# Patient Record
Sex: MALE | Race: WHITE | NOT HISPANIC OR LATINO | Employment: STUDENT | URBAN - METROPOLITAN AREA
[De-identification: names, ages, dates, MRNs, and addresses within clinical notes are randomized per-mention and may not be internally consistent; named-entity substitution may affect disease eponyms.]

---
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
## 2019-01-07 RX ORDER — LEVOTHYROXINE SODIUM 0.07 MG/1
TABLET ORAL
Qty: 30 TABLET | Refills: 0 | Status: SHIPPED | OUTPATIENT
Start: 2019-01-07 | End: 2019-01-09 | Stop reason: SDUPTHER

## 2019-01-08 ENCOUNTER — TELEPHONE (OUTPATIENT)
Dept: INTERNAL MEDICINE | Facility: CLINIC | Age: 19
End: 2019-01-08

## 2019-01-09 ENCOUNTER — TELEPHONE (OUTPATIENT)
Dept: INTERNAL MEDICINE | Facility: CLINIC | Age: 19
End: 2019-01-09

## 2019-01-09 DIAGNOSIS — E66.9 OBESITY (BMI 30-39.9): ICD-10-CM

## 2019-01-09 RX ORDER — LEVOTHYROXINE SODIUM 0.07 MG/1
TABLET ORAL
Qty: 30 TABLET | Refills: 0 | Status: SHIPPED | OUTPATIENT
Start: 2019-01-09 | End: 2019-02-01 | Stop reason: SDUPTHER

## 2019-01-09 NOTE — TELEPHONE ENCOUNTER
Pt mother advised she should have rec'd a letter regarding his labs but given results over the phone via [place].

## 2019-02-01 ENCOUNTER — OFFICE VISIT (OUTPATIENT)
Dept: INTERNAL MEDICINE | Facility: CLINIC | Age: 19
End: 2019-02-01

## 2019-02-01 VITALS
SYSTOLIC BLOOD PRESSURE: 128 MMHG | HEIGHT: 71 IN | BODY MASS INDEX: 34.16 KG/M2 | TEMPERATURE: 97.4 F | WEIGHT: 244 LBS | DIASTOLIC BLOOD PRESSURE: 88 MMHG

## 2019-02-01 DIAGNOSIS — E78.1 HYPERTRIGLYCERIDEMIA: ICD-10-CM

## 2019-02-01 DIAGNOSIS — K76.9 HEPATIC DYSFUNCTION: ICD-10-CM

## 2019-02-01 DIAGNOSIS — E03.9 HYPOTHYROIDISM (ACQUIRED): Primary | ICD-10-CM

## 2019-02-01 PROBLEM — K76.89 HEPATIC DYSFUNCTION: Status: ACTIVE | Noted: 2019-02-01

## 2019-02-01 PROCEDURE — 99214 OFFICE O/P EST MOD 30 MIN: CPT | Performed by: FAMILY MEDICINE

## 2019-02-01 RX ORDER — LEVOTHYROXINE SODIUM 0.07 MG/1
TABLET ORAL
Qty: 30 TABLET | Refills: 2 | Status: SHIPPED | OUTPATIENT
Start: 2019-02-01 | End: 2019-05-07 | Stop reason: SDUPTHER

## 2019-02-01 NOTE — PATIENT INSTRUCTIONS
1. ENDO- hypothyroid, high tg- thyroid at goal. Pt losing weight on thyroid treatment and topamax. Will continue these with RF today.   2. GI- LFT elevation. Discussed that his delusions are not well controlled and his liver enzymes are up. Grandmother needs to discuss the trileptal with his psych.   3. RECHECK- 3mo recheck

## 2019-02-01 NOTE — PROGRESS NOTES
Subjective   Lane Patton is a 18 y.o. male.     History of Present Illness   Here for 6wk recheck thyroid and weight. Last seen 12/21/18 for 6wk recheck thyroid and lipid. Last seen 11/8/18 for 1mo recheck weight and thyroid. Was seen 6/27/178 for ER recheck. Was seen 6/22/18 for recheck on topamax and well child. seen 11/6/17 for sinusitis, treated with biaxin. Was seen 8/8/17 for guardianship discussion. Pt has schizophrenia, treated by psych. Lab 9/24/18 demo normal TSH and neg thyroid abs but low free T4 of 0.76. Lab 1/15/18 demo normal CBC and glu 103, A1C 5.7. Prolactin normal. Had elevated tg 500.     1.CV/ ENDO- hypothyroid, diagnosed 9/24/18, possible metabolic syndrome with elevated tg of 500 on labs 1/15/18 done by psych. Pt has had consistent weight gain due to his psych meds. Had normal glu and A1C but high tg.  Was started on Topamax and lost 12 lb to 247 lb. Then stabilized at 248 lb. Could not afford dietician. Pt cannot take Wellbutrin due to h/o sz and did not benefit from metformin. He was since found to have high TSH and was strted on synthroid. Lab at goal on synthroid 75 at visit 12/21/18; triglycerides also improved some. Weight was still 250 lb (wearing cleats). Pt continued on same. However, his LFT were then elevated and grandmother was advised to discuss his meds with his psych. Today grandmother reports pt has been walking and doing a work out (with Jorge his therapist). Pt has lost 6 lb to 244 lb (not wearing cleats, overall ). Is having bowel cramps with loose stool just this morning.      2. DERM-acne, face and torso. Pt diagnosed previously 2016. Did not respond to topicals. Has not been able to tolerate oral doxy (GI S/E). Was doing better with better facial hygiene at discussion 2/2/18. Got yeast infection 9/24/18; treated with diflucan.   3. GI- chronic gastritis and IBS-C. Pt has done well with Prilosec. Was diagnosed with IBS-D in 2017 after a neg GI w/u including neg UA, normal  gallbladder U/S, h/o appy and GI consult. Did not improve until he was started on linzess; did well with this in combo with Prilosec until 11/8/18 when he started having excessive BM; cut down to 3x/wk and was advised he could also hold it if needed, jose while on metformin.   4. NEURO- migraine. Pt has prn imitrex. No HA yet as last discussion.   5. PSYCH- schizoaffective d/o with autism. Pt sees psych. Was doing well with a positive attitude at his visit 2/2/18. Today pt reports he is no longer having demons because the government took them away but he is the walking dead and the government has not recognized this power yet.     The following portions of the patient's history were reviewed and updated as appropriate: current medications, past family history, past medical history, past social history, past surgical history and problem list.    Review of Systems   Cardiovascular: Negative for chest pain.   Gastrointestinal: Negative for abdominal distention and abdominal pain.   Skin: Negative for color change.   Neurological: Negative for tremors, speech difficulty and headaches.   Psychiatric/Behavioral: Negative for agitation and confusion.   All other systems reviewed and are negative.        Current Outpatient Medications:   •  cyclobenzaprine (FLEXERIL) 10 MG tablet, Take 10 mg by mouth 3 (Three) Times a Day As Needed for Muscle Spasms., Disp: , Rfl:   •  diclofenac (VOLTAREN) 75 MG EC tablet, Take 75 mg by mouth 2 (Two) Times a Day., Disp: , Rfl:   •  GuanFACINE HCl ER 4 MG tablet sustained-release 24 hour, Take  by mouth., Disp: , Rfl:   •  hydrOXYzine (ATARAX) 25 MG tablet, TAKE ONE TABLET BY MOUTH EVERY NIGHT AT BEDTIME AS NEEDED, Disp: 30 tablet, Rfl: 0  •  levothyroxine (SYNTHROID, LEVOTHROID) 75 MCG tablet, TAKE ONE TABLET BY MOUTH EVERY MORNING . WAIT ONE HOUR FOR FOOD OR OTHER MEDICINE, Disp: 30 tablet, Rfl: 2  •  linaclotide (LINZESS) 72 MCG capsule capsule, Take 1 capsule by mouth Every Morning  "Before Breakfast., Disp: 30 capsule, Rfl: 5  •  loratadine (CLARITIN) 10 MG tablet, TAKE ONE TABLET BY MOUTH DAILY AS NEEDED, Disp: 30 tablet, Rfl: 0  •  methylphenidate (RITALIN) 20 MG tablet, , Disp: , Rfl:   •  methylphenidate LA (RITALIN LA) 30 MG 24 hr capsule, Take  by mouth., Disp: , Rfl:   •  montelukast (SINGULAIR) 10 MG tablet, Take 1 tablet by mouth Every Night., Disp: 30 tablet, Rfl: 5  •  omeprazole (priLOSEC) 20 MG capsule, Take 1 capsule by mouth Daily., Disp: 30 capsule, Rfl: 5  •  OXcarbazepine (TRILEPTAL) 150 MG tablet, , Disp: , Rfl:   •  SUMAtriptan (IMITREX) 100 MG tablet, Take 1 tab po prn migraine headache or stomach ache. Can repeat at 2hr prn. Max 2 in 24 hr., Disp: 9 tablet, Rfl: 0  •  topiramate (TOPAMAX) 200 MG tablet, Take 1 tablet by mouth Daily., Disp: 30 tablet, Rfl: 5    Objective     /88   Temp 97.4 °F (36.3 °C)   Ht 180.3 cm (71\")   Wt 111 kg (244 lb)   BMI 34.03 kg/m²     Physical Exam   Constitutional: He is oriented to person, place, and time. He appears well-developed and well-nourished.   HENT:   Right Ear: Tympanic membrane and ear canal normal.   Left Ear: Tympanic membrane and ear canal normal.   Mouth/Throat: Oropharynx is clear and moist.   Eyes: Conjunctivae and EOM are normal. Pupils are equal, round, and reactive to light.   Neck: No thyromegaly present.   Cardiovascular: Normal rate and regular rhythm.   Pulmonary/Chest: Effort normal and breath sounds normal.   Neurological: He is alert and oriented to person, place, and time.   Skin: Skin is warm and dry.   Psychiatric: He has a normal mood and affect.   Vitals reviewed.      Assessment/Plan   Lane was seen today for follow-up.    Diagnoses and all orders for this visit:    Hypothyroidism (acquired)  -     levothyroxine (SYNTHROID, LEVOTHROID) 75 MCG tablet; TAKE ONE TABLET BY MOUTH EVERY MORNING . WAIT ONE HOUR FOR FOOD OR OTHER MEDICINE    Hypertriglyceridemia    Hepatic dysfunction        1. ENDO- " hypothyroid, high tg- thyroid at goal. Pt losing weight on thyroid treatment and topamax. Will continue these with RF today.   2. GI- LFT elevation. Discussed that his delusions are not well controlled and his liver enzymes are up. Grandmother needs to discuss the trileptal with his psych.   3. RECHECK- 3mo recheck

## 2019-02-03 DIAGNOSIS — E66.9 OBESITY (BMI 30-39.9): ICD-10-CM

## 2019-02-04 RX ORDER — METFORMIN HYDROCHLORIDE 500 MG/1
TABLET, EXTENDED RELEASE ORAL
Qty: 30 TABLET | Refills: 0 | OUTPATIENT
Start: 2019-02-04

## 2019-02-22 DIAGNOSIS — E66.9 OBESITY (BMI 30-39.9): ICD-10-CM

## 2019-02-22 RX ORDER — METFORMIN HYDROCHLORIDE 500 MG/1
TABLET, EXTENDED RELEASE ORAL
Qty: 30 TABLET | Refills: 0 | OUTPATIENT
Start: 2019-02-22

## 2019-03-13 DIAGNOSIS — K58.1 IRRITABLE BOWEL SYNDROME WITH CONSTIPATION: ICD-10-CM

## 2019-03-13 DIAGNOSIS — K29.70 GASTRITIS, PRESENCE OF BLEEDING UNSPECIFIED, UNSPECIFIED CHRONICITY, UNSPECIFIED GASTRITIS TYPE: ICD-10-CM

## 2019-03-13 RX ORDER — LINACLOTIDE 72 UG/1
CAPSULE, GELATIN COATED ORAL
Qty: 30 CAPSULE | Refills: 4 | Status: SHIPPED | OUTPATIENT
Start: 2019-03-13 | End: 2019-11-14

## 2019-03-13 RX ORDER — OMEPRAZOLE 20 MG/1
CAPSULE, DELAYED RELEASE ORAL
Qty: 30 CAPSULE | Refills: 3 | Status: SHIPPED | OUTPATIENT
Start: 2019-03-13 | End: 2019-05-07

## 2019-03-18 ENCOUNTER — TELEPHONE (OUTPATIENT)
Dept: INTERNAL MEDICINE | Facility: CLINIC | Age: 19
End: 2019-03-18

## 2019-03-18 NOTE — TELEPHONE ENCOUNTER
I spoke with Earnest who is asking if you would take over writing Lane's controlled medications that he gets from [facility] so that he will no longer have to go there. I advised pt grandmother that I doubted you would take over these medications because you will be leaving and then they would just have to find someone else to start writing them again. Pt grandmother asked that I speak with you just to see. Please advise.

## 2019-03-18 NOTE — TELEPHONE ENCOUNTER
trevon wants you to call her back today because it is very important that she talks to you about the pt. Please call the pt back

## 2019-03-18 NOTE — TELEPHONE ENCOUNTER
You are correct. I cannot take this over as I am leaving in 2mo. Also, his issues are severe enough that he needs a specialist. I advise that they continue to go to Psych, especially as we cannot get anyone in with Psych now as they are all closed to new patients.

## 2019-04-24 ENCOUNTER — OFFICE VISIT (OUTPATIENT)
Dept: INTERNAL MEDICINE | Facility: CLINIC | Age: 19
End: 2019-04-24

## 2019-04-24 VITALS — BODY MASS INDEX: 35.11 KG/M2 | HEIGHT: 71 IN | WEIGHT: 250.8 LBS | TEMPERATURE: 99.8 F

## 2019-04-24 DIAGNOSIS — J06.9 ACUTE URI: Primary | ICD-10-CM

## 2019-04-24 LAB
EXPIRATION DATE: NORMAL
INTERNAL CONTROL: NORMAL
Lab: NORMAL
S PYO AG THROAT QL: NEGATIVE

## 2019-04-24 PROCEDURE — 87880 STREP A ASSAY W/OPTIC: CPT | Performed by: FAMILY MEDICINE

## 2019-04-24 PROCEDURE — 99213 OFFICE O/P EST LOW 20 MIN: CPT | Performed by: FAMILY MEDICINE

## 2019-04-24 RX ORDER — PROMETHAZINE HYDROCHLORIDE 25 MG/1
25 TABLET ORAL EVERY 6 HOURS PRN
Qty: 10 TABLET | Refills: 0 | Status: SHIPPED | OUTPATIENT
Start: 2019-04-24 | End: 2019-07-19

## 2019-04-24 NOTE — PROGRESS NOTES
Subjective   Lane Patton is a 18 y.o. male.     History of Present Illness   Here today as a work in for fever with vomiting. Last seen 2/1/19 for recheck thyroid.     GI-chronic gastritis and IBS-C. Pt has done well with Prilosec. Was diagnosed with IBS-D in 2017 after a neg GI w/u including neg UA, normal gallbladder U/S, h/o appy and GI consult. Did not improve until he was started on linzess; did well with this in combo with Prilosec until 11/8/18 when he started having excessive BM; cut down to 3x/wk and was advised he could also hold it if needed. Today mother reports pt got sick yesterday. Has had fever 101 and vomiting x2. No diarrhea. Has ST and HA. Has head congestion.     The following portions of the patient's history were reviewed and updated as appropriate: current medications, past family history, past medical history, past social history, past surgical history and problem list.    Review of Systems   Constitutional: Positive for fever.   Cardiovascular: Negative for chest pain.   Gastrointestinal: Positive for vomiting. Negative for abdominal distention and abdominal pain.   Skin: Negative for color change.   Neurological: Negative for tremors, speech difficulty and headaches.   Psychiatric/Behavioral: Negative for agitation and confusion.   All other systems reviewed and are negative.        Current Outpatient Medications:   •  cyclobenzaprine (FLEXERIL) 10 MG tablet, Take 10 mg by mouth 3 (Three) Times a Day As Needed for Muscle Spasms., Disp: , Rfl:   •  diclofenac (VOLTAREN) 75 MG EC tablet, Take 75 mg by mouth 2 (Two) Times a Day., Disp: , Rfl:   •  GuanFACINE HCl ER 4 MG tablet sustained-release 24 hour, Take  by mouth., Disp: , Rfl:   •  hydrOXYzine (ATARAX) 25 MG tablet, TAKE ONE TABLET BY MOUTH EVERY NIGHT AT BEDTIME AS NEEDED, Disp: 30 tablet, Rfl: 0  •  levothyroxine (SYNTHROID, LEVOTHROID) 75 MCG tablet, TAKE ONE TABLET BY MOUTH EVERY MORNING . WAIT ONE HOUR FOR FOOD OR OTHER MEDICINE,  "Disp: 30 tablet, Rfl: 2  •  LINZESS 72 MCG capsule capsule, TAKE ONE CAPSULE BY MOUTH EVERY MORNING BEFORE BREAKFAST, Disp: 30 capsule, Rfl: 4  •  loratadine (CLARITIN) 10 MG tablet, TAKE ONE TABLET BY MOUTH DAILY AS NEEDED, Disp: 30 tablet, Rfl: 0  •  methylphenidate (RITALIN) 20 MG tablet, , Disp: , Rfl:   •  methylphenidate LA (RITALIN LA) 30 MG 24 hr capsule, Take  by mouth., Disp: , Rfl:   •  montelukast (SINGULAIR) 10 MG tablet, Take 1 tablet by mouth Every Night., Disp: 30 tablet, Rfl: 5  •  omeprazole (priLOSEC) 20 MG capsule, Take 1 capsule by mouth Daily., Disp: 30 capsule, Rfl: 5  •  omeprazole (priLOSEC) 20 MG capsule, TAKE ONE CAPSULE BY MOUTH DAILY, Disp: 30 capsule, Rfl: 3  •  OXcarbazepine (TRILEPTAL) 150 MG tablet, , Disp: , Rfl:   •  promethazine (PHENERGAN) 25 MG tablet, Take 1 tablet by mouth Every 6 (Six) Hours As Needed for Nausea or Vomiting., Disp: 10 tablet, Rfl: 0  •  SUMAtriptan (IMITREX) 100 MG tablet, Take 1 tab po prn migraine headache or stomach ache. Can repeat at 2hr prn. Max 2 in 24 hr., Disp: 9 tablet, Rfl: 0  •  topiramate (TOPAMAX) 200 MG tablet, Take 1 tablet by mouth Daily., Disp: 30 tablet, Rfl: 5    Objective     Temp 99.8 °F (37.7 °C)   Ht 180.3 cm (71\")   Wt 114 kg (250 lb 12.8 oz)   BMI 34.98 kg/m²     Physical Exam   Constitutional: He is oriented to person, place, and time. He appears well-developed and well-nourished.   HENT:   Right Ear: Tympanic membrane and ear canal normal.   Left Ear: Tympanic membrane and ear canal normal.   Mouth/Throat: Oropharynx is clear and moist.   Eyes: Conjunctivae and EOM are normal. Pupils are equal, round, and reactive to light.   Neck: No thyromegaly present.   Cardiovascular: Normal rate and regular rhythm.   Pulmonary/Chest: Effort normal and breath sounds normal.   Neurological: He is alert and oriented to person, place, and time.   Skin: Skin is warm and dry.   Psychiatric: He has a normal mood and affect.   Vitals " reviewed.      Assessment/Plan   Lane was seen today for illness.    Diagnoses and all orders for this visit:    Acute URI  -     promethazine (PHENERGAN) 25 MG tablet; Take 1 tablet by mouth Every 6 (Six) Hours As Needed for Nausea or Vomiting.  -     POC Rapid Strep A      1. Kelvin Patton- URI. Strep test neg. Will treat with phenergan for N/V and advised this should resolve on its own within a week.  2. RECHECK- prn

## 2019-04-25 NOTE — PATIENT INSTRUCTIONS
1. Kelvin BABB. Strep test neg. Will treat with phenergan for N/V and advised this should resolve on its own within a week.  2. RECHECK- prn

## 2019-05-07 ENCOUNTER — OFFICE VISIT (OUTPATIENT)
Dept: INTERNAL MEDICINE | Facility: CLINIC | Age: 19
End: 2019-05-07

## 2019-05-07 VITALS
DIASTOLIC BLOOD PRESSURE: 74 MMHG | SYSTOLIC BLOOD PRESSURE: 116 MMHG | HEIGHT: 71 IN | TEMPERATURE: 97.4 F | WEIGHT: 250.4 LBS | BODY MASS INDEX: 35.06 KG/M2

## 2019-05-07 DIAGNOSIS — B96.89 ACUTE BACTERIAL SINUSITIS: ICD-10-CM

## 2019-05-07 DIAGNOSIS — J30.1 NON-SEASONAL ALLERGIC RHINITIS DUE TO POLLEN: ICD-10-CM

## 2019-05-07 DIAGNOSIS — E03.9 HYPOTHYROIDISM (ACQUIRED): Primary | ICD-10-CM

## 2019-05-07 DIAGNOSIS — J01.90 ACUTE BACTERIAL SINUSITIS: ICD-10-CM

## 2019-05-07 PROCEDURE — 99213 OFFICE O/P EST LOW 20 MIN: CPT | Performed by: FAMILY MEDICINE

## 2019-05-07 RX ORDER — CLARITHROMYCIN 500 MG/1
500 TABLET, COATED ORAL 2 TIMES DAILY
Qty: 14 TABLET | Refills: 0 | Status: SHIPPED | OUTPATIENT
Start: 2019-05-07 | End: 2019-07-19

## 2019-05-07 RX ORDER — LEVOTHYROXINE SODIUM 0.07 MG/1
TABLET ORAL
Qty: 30 TABLET | Refills: 2 | Status: SHIPPED | OUTPATIENT
Start: 2019-05-07 | End: 2019-07-20 | Stop reason: SDUPTHER

## 2019-05-07 RX ORDER — MONTELUKAST SODIUM 10 MG/1
10 TABLET ORAL NIGHTLY
Qty: 30 TABLET | Refills: 5 | Status: SHIPPED | OUTPATIENT
Start: 2019-05-07 | End: 2019-11-01 | Stop reason: SDUPTHER

## 2019-05-07 NOTE — PROGRESS NOTES
Subjective   Lane Patton is a 18 y.o. male.     History of Present Illness   Here for 3mo routine. Last seen 4/24/19 for URI, treated with Phenergan. Was seen 2/1/19 for recheck thyroid. Was seen 12/21/18 for 6wk recheck thyroid and lipid. Last seen 11/8/18 for 1mo recheck weight and thyroid. Was seen 6/27/178 for ER recheck. Was seen 6/22/18 for recheck on topamax and well child. seen 11/6/17 for sinusitis, treated with biaxin. Was seen 8/8/17 for guardianship discussion. Pt has schizophrenia, treated by psych. Lab 9/24/18 demo normal TSH and neg thyroid abs but low free T4 of 0.76. Lab 1/15/18 demo normal CBC and glu 103, A1C 5.7. Prolactin normal. Had elevated tg 500.     1.CV/ ENDO- hypothyroid, diagnosed 9/24/18, possible metabolic syndrome with elevated tg of 500 on labs 1/15/18 done by psych. Pt has had consistent weight gain due to his psych meds. Had normal glu and A1C but high tg.  Was started on Topamax and lost 12 lb to 247 lb. Then stabilized at 248 lb. Could not afford dietician. Pt cannot take Wellbutrin due to h/o sz and did not benefit from metformin. He was since found to have high TSH and was strted on synthroid. Lab at goal on synthroid 75 at visit 12/21/18; triglycerides also improved some. Pt then lost 6lb to 244 lb. Was more active and doing a work out with his therapist, Jorge. No dose change. Today grandmother reports she does not feel the topamax is working. Pt has regained the 6 lb he has lost. Cannot  his energy level as he has been sick for 3 wk.     2. RESP- today grandmother reports his URI did not resolve and now pt is coughing up green phlegm.     3. DERM-acne, face and torso. Pt diagnosed previously 2016. Did not respond to topicals. Has not been able to tolerate oral doxy (GI S/E). Was doing better with better facial hygiene at discussion 2/2/18. Got yeast infection 9/24/18; treated with diflucan.   4. GI-chronic gastritis and IBS-C. Pt has done well with Prilosec. Was  diagnosed with IBS-D in 2017 after a neg GI w/u including neg UA, normal gallbladder U/S, h/o appy and GI consult. Did not improve until he was started on linzess; did well with this in combo with Prilosec until 11/8/18 when he started having excessive BM; cut down to 3x/wk and was advised he could also hold it if needed. Was seen 4/24/19 for vomiting related to PND from URI; given Phenergan.  5. NEURO- migraine. Pt has prn imitrex. No HA yet as last discussion.   6. PSYCH- schizoaffective d/o with autism. Pt sees psych. Was doing well with a positive attitude at his visit 2/2/18. Was having increased delusions and LFT elevation; grandmother was advised to discuss his trileptal with psych.     The following portions of the patient's history were reviewed and updated as appropriate: current medications, past family history, past medical history, past social history, past surgical history and problem list.    Review of Systems   Cardiovascular: Negative for chest pain.   Gastrointestinal: Negative for abdominal distention and abdominal pain.   Skin: Negative for color change.   Neurological: Negative for tremors, speech difficulty and headaches.   Psychiatric/Behavioral: Negative for agitation and confusion.   All other systems reviewed and are negative.        Current Outpatient Medications:   •  clarithromycin (BIAXIN) 500 MG tablet, Take 1 tablet by mouth 2 (Two) Times a Day., Disp: 14 tablet, Rfl: 0  •  cyclobenzaprine (FLEXERIL) 10 MG tablet, Take 10 mg by mouth 3 (Three) Times a Day As Needed for Muscle Spasms., Disp: , Rfl:   •  diclofenac (VOLTAREN) 75 MG EC tablet, Take 75 mg by mouth 2 (Two) Times a Day., Disp: , Rfl:   •  GuanFACINE HCl ER 4 MG tablet sustained-release 24 hour, Take  by mouth., Disp: , Rfl:   •  hydrOXYzine (ATARAX) 25 MG tablet, TAKE ONE TABLET BY MOUTH EVERY NIGHT AT BEDTIME AS NEEDED, Disp: 30 tablet, Rfl: 0  •  levothyroxine (SYNTHROID, LEVOTHROID) 75 MCG tablet, TAKE ONE TABLET BY  "MOUTH EVERY MORNING . WAIT ONE HOUR FOR FOOD OR OTHER MEDICINE, Disp: 30 tablet, Rfl: 2  •  LINZESS 72 MCG capsule capsule, TAKE ONE CAPSULE BY MOUTH EVERY MORNING BEFORE BREAKFAST, Disp: 30 capsule, Rfl: 4  •  loratadine (CLARITIN) 10 MG tablet, TAKE ONE TABLET BY MOUTH DAILY AS NEEDED, Disp: 30 tablet, Rfl: 0  •  methylphenidate (RITALIN) 20 MG tablet, , Disp: , Rfl:   •  methylphenidate LA (RITALIN LA) 30 MG 24 hr capsule, Take  by mouth., Disp: , Rfl:   •  montelukast (SINGULAIR) 10 MG tablet, Take 1 tablet by mouth Every Night., Disp: 30 tablet, Rfl: 5  •  omeprazole (priLOSEC) 20 MG capsule, Take 1 capsule by mouth Daily., Disp: 30 capsule, Rfl: 5  •  OXcarbazepine (TRILEPTAL) 150 MG tablet, , Disp: , Rfl:   •  promethazine (PHENERGAN) 25 MG tablet, Take 1 tablet by mouth Every 6 (Six) Hours As Needed for Nausea or Vomiting., Disp: 10 tablet, Rfl: 0  •  SUMAtriptan (IMITREX) 100 MG tablet, Take 1 tab po prn migraine headache or stomach ache. Can repeat at 2hr prn. Max 2 in 24 hr., Disp: 9 tablet, Rfl: 0    Objective     /74   Temp 97.4 °F (36.3 °C)   Ht 180.3 cm (71\")   Wt 114 kg (250 lb 6.4 oz)   BMI 34.92 kg/m²     Physical Exam   Constitutional: He is oriented to person, place, and time. He appears well-developed and well-nourished.   HENT:   Right Ear: Tympanic membrane and ear canal normal.   Left Ear: Tympanic membrane and ear canal normal.   Mouth/Throat: Oropharynx is clear and moist.   Eyes: Conjunctivae and EOM are normal. Pupils are equal, round, and reactive to light.   Neck: No thyromegaly present.   Cardiovascular: Normal rate and regular rhythm.   Pulmonary/Chest: Effort normal and breath sounds normal.   Neurological: He is alert and oriented to person, place, and time.   Skin: Skin is warm and dry.   Psychiatric: He has a normal mood and affect.   Vitals reviewed.      Assessment/Plan   Lane was seen today for follow-up.    Diagnoses and all orders for this " visit:    Hypothyroidism (acquired)  -     levothyroxine (SYNTHROID, LEVOTHROID) 75 MCG tablet; TAKE ONE TABLET BY MOUTH EVERY MORNING . WAIT ONE HOUR FOR FOOD OR OTHER MEDICINE    Acute bacterial sinusitis  -     clarithromycin (BIAXIN) 500 MG tablet; Take 1 tablet by mouth 2 (Two) Times a Day.    Non-seasonal allergic rhinitis due to pollen  -     montelukast (SINGULAIR) 10 MG tablet; Take 1 tablet by mouth Every Night.      1. ENDO- hypothyroid- will stop the topamax. Will continue on synthroid 75 and then reassess after he is no longer sick. Will need labs at that time.   2. RESP- sinusitis. Will treat with biaxin today and pt is to restart his singulair for the allergy season.   3. RECHECK- new provider in [place]

## 2019-05-07 NOTE — PATIENT INSTRUCTIONS
1. ENDO- hypothyroid- will stop the topamax. Will continue on synthroid 75 and then reassess after he is no longer sick. Will need labs at that time.   2. RESP- sinusitis. Will treat with biaxin today and pt is to restart his singulair for the allergy season.   3. RECHECK- new provider in [place]

## 2019-07-19 ENCOUNTER — OFFICE VISIT (OUTPATIENT)
Dept: FAMILY MEDICINE CLINIC | Facility: CLINIC | Age: 19
End: 2019-07-19

## 2019-07-19 DIAGNOSIS — F84.0 AUTISM: ICD-10-CM

## 2019-07-19 DIAGNOSIS — E88.81 METABOLIC SYNDROME: ICD-10-CM

## 2019-07-19 DIAGNOSIS — E03.9 HYPOTHYROIDISM (ACQUIRED): Primary | ICD-10-CM

## 2019-07-19 DIAGNOSIS — E66.9 OBESITY (BMI 30-39.9): ICD-10-CM

## 2019-07-19 PROCEDURE — 99213 OFFICE O/P EST LOW 20 MIN: CPT | Performed by: FAMILY MEDICINE

## 2019-07-19 RX ORDER — QUETIAPINE FUMARATE 300 MG/1
300 TABLET, FILM COATED ORAL NIGHTLY
COMMUNITY

## 2019-07-19 RX ORDER — PRAZOSIN HYDROCHLORIDE 2 MG/1
2 CAPSULE ORAL NIGHTLY
COMMUNITY

## 2019-07-19 NOTE — PROGRESS NOTES
Subjective   Lane Patton is a 18 y.o. male.   Chief Complaint   Patient presents with   • Establish Care   • Insomnia     x years      History of Present Illness   Here to establish care, with his mother today. He does have a history of autism, mom provides HPI.     He is following with [facility] for treatment of ADHD, Autism, insomnia, schizoaffective disorder.   They currently have him on Oxcarbazine, Ritalin, Seroquel, Prazosin.   Struggling with sleep at night, hard for him to fall asleep.     Hypothyroidism  Chronic condition, treated with levothyroxine 75 mcg daily.   Labs haven't been updated recently.   No previous history of thyroid ultrasound.   He has had significant weight gain.   Also, history of metabolic syndrome.     The following portions of the patient's history were reviewed and updated as appropriate: allergies, current medications, past family history, past medical history, past social history, past surgical history and problem list.    Review of Systems   Constitutional: Positive for unexpected weight gain. Negative for chills, fatigue and fever.   HENT: Negative.    Eyes: Negative.    Respiratory: Negative for cough, chest tightness and shortness of breath.    Cardiovascular: Negative for chest pain and palpitations.   Skin: Negative for rash.   Neurological: Negative for light-headedness, headache and confusion.   Hematological: Negative for adenopathy. Does not bruise/bleed easily.   Psychiatric/Behavioral: Positive for sleep disturbance.       Objective   Physical Exam   Constitutional: He is oriented to person, place, and time. He appears well-developed and well-nourished. No distress.   HENT:   Head: Normocephalic.   Right Ear: External ear normal.   Left Ear: External ear normal.   Nose: Nose normal.   Eyes: Conjunctivae are normal.   Neck: Normal range of motion. Neck supple.   Cardiovascular: Normal rate, regular rhythm and normal heart sounds.   No murmur heard.  Pulmonary/Chest:  Effort normal and breath sounds normal. He has no wheezes.   Abdominal: Soft.   obese   Musculoskeletal: He exhibits no edema.   Lymphadenopathy:     He has no cervical adenopathy.   Neurological: He is alert and oriented to person, place, and time.   Skin: Skin is warm and dry.   Psychiatric:   Avoids eye contact  He is inattentive.   Nursing note and vitals reviewed.        Assessment/Plan   Lane was seen today for establish care and insomnia.    Diagnoses and all orders for this visit:    Hypothyroidism (acquired)  -     Comprehensive Metabolic Panel  -     CBC & Differential  -     TSH+Free T4  -     Hemoglobin A1c  -      Thyroid    Metabolic syndrome  -     Comprehensive Metabolic Panel  -     CBC & Differential  -     TSH+Free T4  -     Hemoglobin A1c      Labs updated today.   Encourage his mother to notify Comp Care provider regarding insomnia. He is on multiple medications for sleep and psychiatric conditions, which they are managing.

## 2019-07-19 NOTE — PATIENT INSTRUCTIONS
Go to the nearest ER or return to clinic if symptoms worsen, fever/chill develop    Nosebleed, Adult  A nosebleed is when blood comes out of the nose. Nosebleeds are common. Usually, they are not a sign of a serious condition.  Nosebleeds can happen if a small blood vessel in your nose starts to bleed or if the lining of your nose (mucous membrane) cracks. They are commonly caused by:  · Allergies.  · Colds.  · Picking your nose.  · Blowing your nose too hard.  · An injury from sticking an object into your nose or getting hit in the nose.  · Dry or cold air.    Less common causes of nosebleeds include:  · Toxic fumes.  · Something abnormal in the nose or in the air-filled spaces in the bones of the face (sinuses).  · Growths in the nose, such as polyps.  · Medicines or conditions that cause blood to clot slowly.  · Certain illnesses or procedures that irritate or dry out the nasal passages.    Follow these instructions at home:  When you have a nosebleed:  · Sit down and tilt your head slightly forward.  · Use a clean towel or tissue to pinch your nostrils under the bony part of your nose. After 10 minutes, let go of your nose and see if bleeding starts again. Do not release pressure before that time. If there is still bleeding, repeat the pinching and holding for 10 minutes until the bleeding stops.  · Do not place tissues or gauze in the nose to stop bleeding.  · Avoid lying down and avoid tilting your head backward. That may make blood collect in the throat and cause gagging or coughing.  · Use a nasal spray decongestant to help with a nosebleed as told by your health care provider.  · Do not use petroleum jelly or mineral oil in your nose. It can drip into your lungs.  After a nosebleed:  · Avoid blowing your nose or sniffing for a number of hours.  · Avoid straining, lifting, or bending at the waist for several days. You may resume other normal activities as you are able.  · Use saline spray or a humidifier as  told by your health care provider.  · Aspirin and blood thinners make bleeding more likely. If you are prescribed these medicines and you suffer from nosebleeds:  ? Ask your health care provider if you should stop taking the medicines or if you should adjust the dose.  ? Do not stop taking medicines that your health care provider has recommended unless told by your health care provider.  · If your nosebleed was caused by dry mucous membranes, use over-the-counter saline nasal spray or gel. This will keep the mucous membranes moist and allow them to heal. If you must use a lubricant:  ? Choose one that is water-soluble.  ? Use only as much as you need and use it only as often as needed.  ? Do not lie down until several hours after you use it.  Contact a health care provider if:  · You have a fever.  · You get nosebleeds often or more often than usual.  · You bruise very easily.  · You have a nosebleed from having something stuck in your nose.  · You have bleeding in your mouth.  · You vomit or cough up brown material.  · You have a nosebleed after you start a new medicine.  Get help right away if:  · You have a nosebleed after a fall or a head injury.  · Your nosebleed does not go away after 20 minutes.  · You feel dizzy or weak.  · You have unusual bleeding from other parts of your body.  · You have unusual bruising on other parts of your body.  · You become sweaty.  · You vomit blood.  This information is not intended to replace advice given to you by your health care provider. Make sure you discuss any questions you have with your health care provider.  Document Released: 09/27/2006 Document Revised: 04/24/2018 Document Reviewed: 07/04/2017  Elsevier Interactive Patient Education © 2019 [employer]

## 2019-07-20 VITALS
RESPIRATION RATE: 18 BRPM | TEMPERATURE: 99.1 F | HEIGHT: 71 IN | BODY MASS INDEX: 36.68 KG/M2 | DIASTOLIC BLOOD PRESSURE: 88 MMHG | SYSTOLIC BLOOD PRESSURE: 126 MMHG | HEART RATE: 88 BPM | WEIGHT: 262 LBS

## 2019-07-20 DIAGNOSIS — E03.9 HYPOTHYROIDISM (ACQUIRED): ICD-10-CM

## 2019-07-20 LAB
ALBUMIN SERPL-MCNC: 4.7 G/DL (ref 3.5–5.2)
ALBUMIN/GLOB SERPL: 1.5 G/DL
ALP SERPL-CCNC: 71 U/L (ref 39–117)
ALT SERPL-CCNC: 74 U/L (ref 1–41)
AST SERPL-CCNC: 45 U/L (ref 1–40)
BASOPHILS # BLD AUTO: 0.03 10*3/MM3 (ref 0–0.2)
BASOPHILS NFR BLD AUTO: 0.3 % (ref 0–1.5)
BILIRUB SERPL-MCNC: 0.3 MG/DL (ref 0.2–1.2)
BUN SERPL-MCNC: 8 MG/DL (ref 6–20)
BUN/CREAT SERPL: 9 (ref 7–25)
CALCIUM SERPL-MCNC: 9.8 MG/DL (ref 8.6–10.5)
CHLORIDE SERPL-SCNC: 99 MMOL/L (ref 98–107)
CO2 SERPL-SCNC: 26 MMOL/L (ref 22–29)
CREAT SERPL-MCNC: 0.89 MG/DL (ref 0.76–1.27)
EOSINOPHIL # BLD AUTO: 0.05 10*3/MM3 (ref 0–0.4)
EOSINOPHIL NFR BLD AUTO: 0.5 % (ref 0.3–6.2)
ERYTHROCYTE [DISTWIDTH] IN BLOOD BY AUTOMATED COUNT: 13.1 % (ref 12.3–15.4)
GLOBULIN SER CALC-MCNC: 3.1 GM/DL
GLUCOSE SERPL-MCNC: 91 MG/DL (ref 65–99)
HBA1C MFR BLD: 5.52 % (ref 4.8–5.6)
HCT VFR BLD AUTO: 50 % (ref 37.5–51)
HGB BLD-MCNC: 15.9 G/DL (ref 13–17.7)
IMM GRANULOCYTES # BLD AUTO: 0.03 10*3/MM3 (ref 0–0.05)
IMM GRANULOCYTES NFR BLD AUTO: 0.3 % (ref 0–0.5)
LYMPHOCYTES # BLD AUTO: 2.9 10*3/MM3 (ref 0.7–3.1)
LYMPHOCYTES NFR BLD AUTO: 31.6 % (ref 19.6–45.3)
MCH RBC QN AUTO: 27.6 PG (ref 26.6–33)
MCHC RBC AUTO-ENTMCNC: 31.8 G/DL (ref 31.5–35.7)
MCV RBC AUTO: 86.7 FL (ref 79–97)
MONOCYTES # BLD AUTO: 0.72 10*3/MM3 (ref 0.1–0.9)
MONOCYTES NFR BLD AUTO: 7.8 % (ref 5–12)
NEUTROPHILS # BLD AUTO: 5.46 10*3/MM3 (ref 1.7–7)
NEUTROPHILS NFR BLD AUTO: 59.5 % (ref 42.7–76)
NRBC BLD AUTO-RTO: 0 /100 WBC (ref 0–0.2)
PLATELET # BLD AUTO: 307 10*3/MM3 (ref 140–450)
POTASSIUM SERPL-SCNC: 4.7 MMOL/L (ref 3.5–5.2)
PROT SERPL-MCNC: 7.8 G/DL (ref 6–8.5)
RBC # BLD AUTO: 5.77 10*6/MM3 (ref 4.14–5.8)
SODIUM SERPL-SCNC: 141 MMOL/L (ref 136–145)
T4 FREE SERPL-MCNC: 0.92 NG/DL (ref 0.93–1.6)
TSH SERPL DL<=0.005 MIU/L-ACNC: 1.47 UIU/ML (ref 0.45–4.5)
WBC # BLD AUTO: 9.19 10*3/MM3 (ref 3.4–10.8)

## 2019-07-20 RX ORDER — LEVOTHYROXINE SODIUM 88 UG/1
TABLET ORAL
Qty: 30 TABLET | Refills: 2 | Status: SHIPPED | OUTPATIENT
Start: 2019-07-20 | End: 2019-11-01 | Stop reason: SDUPTHER

## 2019-07-26 ENCOUNTER — HOSPITAL ENCOUNTER (OUTPATIENT)
Dept: ULTRASOUND IMAGING | Facility: HOSPITAL | Age: 19
Discharge: HOME | End: 2019-07-26
Admitting: FAMILY MEDICINE

## 2019-07-26 PROCEDURE — 76536 US EXAM OF HEAD AND NECK: CPT

## 2019-08-12 ENCOUNTER — OFFICE VISIT (OUTPATIENT)
Dept: FAMILY MEDICINE CLINIC | Facility: CLINIC | Age: 19
End: 2019-08-12

## 2019-08-12 VITALS
TEMPERATURE: 98.6 F | WEIGHT: 264 LBS | BODY MASS INDEX: 36.96 KG/M2 | HEIGHT: 71 IN | DIASTOLIC BLOOD PRESSURE: 82 MMHG | RESPIRATION RATE: 18 BRPM | HEART RATE: 86 BPM | SYSTOLIC BLOOD PRESSURE: 128 MMHG

## 2019-08-12 DIAGNOSIS — J02.9 ACUTE PHARYNGITIS, UNSPECIFIED ETIOLOGY: ICD-10-CM

## 2019-08-12 DIAGNOSIS — J06.9 ACUTE URI: Primary | ICD-10-CM

## 2019-08-12 PROCEDURE — 99213 OFFICE O/P EST LOW 20 MIN: CPT | Performed by: PHYSICIAN ASSISTANT

## 2019-08-12 RX ORDER — AZITHROMYCIN 250 MG/1
TABLET, FILM COATED ORAL
Qty: 6 TABLET | Refills: 0 | Status: SHIPPED | OUTPATIENT
Start: 2019-08-12 | End: 2019-11-14

## 2019-08-12 NOTE — PROGRESS NOTES
"Lance Patton is a 18 y.o. male.   Chief Complaint   Patient presents with   • Nasal Congestion   • Earache     right    • Sinus Problem   • Sore Throat      History of Present Illness   Pt presents with mother for concerns of sore throat, sinus congestion and drainage, HA, R ear pain X 3 days. Symptoms seem to be worsening. Taking OTC tylenol with little relief   No fever, N/V/D/F     The following portions of the patient's history were reviewed and updated as appropriate: allergies, current medications, past family history, past medical history, past social history, past surgical history and problem list.    Review of Systems   Constitutional: Negative.  Negative for chills, diaphoresis, fatigue and fever.   HENT: Positive for congestion, ear pain, postnasal drip and sore throat. Negative for ear discharge, hearing loss, nosebleeds, sinus pressure and sneezing.    Eyes: Negative.    Respiratory: Negative.  Negative for cough, chest tightness, shortness of breath and wheezing.    Cardiovascular: Negative.  Negative for chest pain, palpitations and leg swelling.   Gastrointestinal: Negative for abdominal distention, abdominal pain, blood in stool, constipation, diarrhea, nausea and vomiting.   Genitourinary: Negative.  Negative for difficulty urinating, dysuria, flank pain, frequency, hematuria and urgency.   Musculoskeletal: Negative.  Negative for arthralgias, back pain, gait problem, joint swelling, myalgias, neck pain and neck stiffness.   Skin: Negative.  Negative for color change, pallor, rash and wound.   Neurological: Negative for dizziness, syncope, weakness, light-headedness, numbness and headaches.       Objective    Blood pressure 128/82, pulse 86, temperature 98.6 °F (37 °C), resp. rate 18, height 180.3 cm (71\"), weight 120 kg (264 lb).     Physical Exam   Constitutional: He appears well-developed and well-nourished.   HENT:   Head: Normocephalic and atraumatic.   Right Ear: External ear " normal.   Left Ear: Tympanic membrane, external ear and ear canal normal.   Nose: Mucosal edema present. Right sinus exhibits frontal sinus tenderness. Right sinus exhibits no maxillary sinus tenderness. Left sinus exhibits frontal sinus tenderness. Left sinus exhibits no maxillary sinus tenderness.   Mouth/Throat: Posterior oropharyngeal erythema present. No oropharyngeal exudate or posterior oropharyngeal edema.   Excess cerumen in R ear canal. TM partially observed and normal    Eyes: Conjunctivae are normal.   Neck: Normal range of motion. Neck supple. No tracheal deviation present. No thyromegaly present.   Cardiovascular: Normal rate, regular rhythm, normal heart sounds and intact distal pulses. Exam reveals no gallop and no friction rub.   No murmur heard.  Pulmonary/Chest: Effort normal and breath sounds normal. No respiratory distress. He has no wheezes. He has no rales. He exhibits no tenderness.   Lymphadenopathy:     He has no cervical adenopathy.   Skin: Skin is warm and dry.   Psychiatric:   Playing with toys during visit, mother answered most of questions    Nursing note and vitals reviewed.      Assessment/Plan   Lane was seen today for nasal congestion, earache, sinus problem and sore throat.    Diagnoses and all orders for this visit:    Acute URI  -     azithromycin (ZITHROMAX Z-EDWARD) 250 MG tablet; Take 2 tablets the first day, then 1 tablet daily for 4 days.    Acute pharyngitis, unspecified etiology    strep screen negative. Pt and mother aware.   Treatment as outlined in plan. F/U INB   symptomatic treatment with antihistamine and Nasacort - samples provided

## 2019-08-20 ENCOUNTER — RESULTS ENCOUNTER (OUTPATIENT)
Dept: FAMILY MEDICINE CLINIC | Facility: CLINIC | Age: 19
End: 2019-08-20

## 2019-08-20 DIAGNOSIS — E03.9 HYPOTHYROIDISM (ACQUIRED): ICD-10-CM

## 2019-11-01 DIAGNOSIS — J30.1 NON-SEASONAL ALLERGIC RHINITIS DUE TO POLLEN: ICD-10-CM

## 2019-11-01 DIAGNOSIS — E03.9 HYPOTHYROIDISM (ACQUIRED): ICD-10-CM

## 2019-11-01 RX ORDER — MONTELUKAST SODIUM 10 MG/1
10 TABLET ORAL NIGHTLY
Qty: 30 TABLET | Refills: 5 | Status: SHIPPED | OUTPATIENT
Start: 2019-11-01 | End: 2020-04-27

## 2019-11-01 RX ORDER — LEVOTHYROXINE SODIUM 88 UG/1
TABLET ORAL
Qty: 30 TABLET | Refills: 5 | Status: SHIPPED | OUTPATIENT
Start: 2019-11-01 | End: 2020-08-03

## 2019-11-14 ENCOUNTER — OFFICE VISIT (OUTPATIENT)
Dept: FAMILY MEDICINE CLINIC | Facility: CLINIC | Age: 19
End: 2019-11-14

## 2019-11-14 VITALS
SYSTOLIC BLOOD PRESSURE: 128 MMHG | HEIGHT: 71 IN | TEMPERATURE: 98 F | OXYGEN SATURATION: 98 % | DIASTOLIC BLOOD PRESSURE: 84 MMHG | RESPIRATION RATE: 18 BRPM | BODY MASS INDEX: 36.82 KG/M2 | HEART RATE: 97 BPM | WEIGHT: 263 LBS

## 2019-11-14 DIAGNOSIS — R04.0 EPISTAXIS: ICD-10-CM

## 2019-11-14 DIAGNOSIS — J30.89 ALLERGIC RHINITIS DUE TO OTHER ALLERGIC TRIGGER, UNSPECIFIED SEASONALITY: ICD-10-CM

## 2019-11-14 DIAGNOSIS — Z00.00 ANNUAL PHYSICAL EXAM: Primary | ICD-10-CM

## 2019-11-14 DIAGNOSIS — E03.9 HYPOTHYROIDISM (ACQUIRED): ICD-10-CM

## 2019-11-14 DIAGNOSIS — K29.70 GASTRITIS, PRESENCE OF BLEEDING UNSPECIFIED, UNSPECIFIED CHRONICITY, UNSPECIFIED GASTRITIS TYPE: ICD-10-CM

## 2019-11-14 DIAGNOSIS — Z86.39 H/O IRON DEFICIENCY: ICD-10-CM

## 2019-11-14 PROCEDURE — 99395 PREV VISIT EST AGE 18-39: CPT | Performed by: FAMILY MEDICINE

## 2019-11-14 RX ORDER — CETIRIZINE HYDROCHLORIDE 10 MG/1
10 TABLET ORAL DAILY
Qty: 90 TABLET | Refills: 3 | Status: SHIPPED | OUTPATIENT
Start: 2019-11-14 | End: 2021-01-18

## 2019-11-14 RX ORDER — OMEPRAZOLE 20 MG/1
20 CAPSULE, DELAYED RELEASE ORAL DAILY
Qty: 90 CAPSULE | Refills: 1 | Status: SHIPPED | OUTPATIENT
Start: 2019-11-14 | End: 2020-05-19

## 2019-11-14 NOTE — PATIENT INSTRUCTIONS
Go to the nearest ER or return to clinic if symptoms worsen, fever/chill develop    Preventive Care 18-39 Years, Male  Preventive care refers to lifestyle choices and visits with your health care provider that can promote health and wellness.  What does preventive care include?    · A yearly physical exam. This is also called an annual well check.  · Dental exams once or twice a year.  · Routine eye exams. Ask your health care provider how often you should have your eyes checked.  · Personal lifestyle choices, including:  ? Daily care of your teeth and gums.  ? Regular physical activity.  ? Eating a healthy diet.  ? Avoiding tobacco and drug use.  ? Limiting alcohol use.  ? Practicing safe sex.  What happens during an annual well check?  The services and screenings done by your health care provider during your annual well check will depend on your age, overall health, lifestyle risk factors, and family history of disease.  Counseling  Your health care provider may ask you questions about your:  · Alcohol use.  · Tobacco use.  · Drug use.  · Emotional well-being.  · Home and relationship well-being.  · Sexual activity.  · Eating habits.  · Work and work environment.  Screening  You may have the following tests or measurements:  · Height, weight, and BMI.  · Blood pressure.  · Lipid and cholesterol levels. These may be checked every 5 years starting at age 20.  · Diabetes screening. This is done by checking your blood sugar (glucose) after you have not eaten for a while (fasting).  · Skin check.  · Hepatitis C blood test.  · Hepatitis B blood test.  · Sexually transmitted disease (STD) testing.  Discuss your test results, treatment options, and if necessary, the need for more tests with your health care provider.  Vaccines  Your health care provider may recommend certain vaccines, such as:  · Influenza vaccine. This is recommended every year.  · Tetanus, diphtheria, and acellular pertussis (Tdap, Td) vaccine. You may  need a Td booster every 10 years.  · Varicella vaccine. You may need this if you have not been vaccinated.  · HPV vaccine. If you are 26 or younger, you may need three doses over 6 months.  · Measles, mumps, and rubella (MMR) vaccine. You may need at least one dose of MMR. You may also need a second dose.  · Pneumococcal 13-valent conjugate (PCV13) vaccine. You may need this if you have certain conditions and have not been vaccinated.  · Pneumococcal polysaccharide (PPSV23) vaccine. You may need one or two doses if you smoke cigarettes or if you have certain conditions.  · Meningococcal vaccine. One dose is recommended if you are age 19-21 years and a first-year college student living in a residence moon, or if you have one of several medical conditions. You may also need additional booster doses.  · Hepatitis A vaccine. You may need this if you have certain conditions or if you travel or work in places where you may be exposed to hepatitis A.  · Hepatitis B vaccine. You may need this if you have certain conditions or if you travel or work in places where you may be exposed to hepatitis B.  · Haemophilus influenzae type b (Hib) vaccine. You may need this if you have certain risk factors.  Talk to your health care provider about which screenings and vaccines you need and how often you need them.  This information is not intended to replace advice given to you by your health care provider. Make sure you discuss any questions you have with your health care provider.  Document Released: 02/13/2003 Document Revised: 07/31/2018 Document Reviewed: 10/18/2016  [employer] Interactive Patient Education © 2019 [employer]

## 2019-11-14 NOTE — PROGRESS NOTES
Subjective   Lane Patton is a 19 y.o. male.   Chief Complaint   Patient presents with   • Annual Exam   • Allergies     Meds not working      History of Present Illness   Here for annual exam   Grandmother provides majority information  Dental exam: not up to date, scheduled 11/19/19.  Eye exam: up to date within 1 year  He has had iron deficiency previously, grandmother is requesting that this be rechecked.     Allergies have been worse recently.   Currently only treated with Singulair daily. Stopped taking loratadine due to ineffectiveness.   He is having nose bleeds frequently.     Needing refill of omeprazole.     The following portions of the patient's history were reviewed and updated as appropriate: allergies, current medications, past family history, past medical history, past social history, past surgical history and problem list.    Review of Systems   Constitutional: Negative for fatigue and fever.   HENT: Positive for nosebleeds.    Eyes: Negative.    Respiratory: Negative for cough, chest tightness and shortness of breath.    Cardiovascular: Negative for chest pain and palpitations.   Skin: Negative for rash.   Allergic/Immunologic: Positive for environmental allergies.   Neurological: Negative for light-headedness, headache and confusion.   Hematological: Negative for adenopathy.   Psychiatric/Behavioral: Negative.        Objective   Physical Exam   Constitutional: He is oriented to person, place, and time. He appears well-developed and well-nourished. No distress.   HENT:   Head: Normocephalic.   Right Ear: Hearing, tympanic membrane, external ear and ear canal normal.   Left Ear: Hearing, tympanic membrane, external ear and ear canal normal.   Nose: Nose normal.   Mouth/Throat: Uvula is midline, oropharynx is clear and moist and mucous membranes are normal.   Eyes: Conjunctivae are normal.   Neck: Neck supple.   Cardiovascular: Normal rate, regular rhythm and normal heart sounds.   No murmur  heard.  Pulmonary/Chest: Effort normal and breath sounds normal. He has no wheezes.   Abdominal:   obese   Musculoskeletal: He exhibits no edema.   Lymphadenopathy:     He has no cervical adenopathy.   Neurological: He is alert and oriented to person, place, and time.   Skin: Skin is warm and dry.   Psychiatric: He has a normal mood and affect. His behavior is normal.   Nursing note and vitals reviewed.        Assessment/Plan   Lane was seen today for annual exam and allergies.    Diagnoses and all orders for this visit:    Annual physical exam    Allergic rhinitis due to other allergic trigger, unspecified seasonality  -     cetirizine (zyrTEC) 10 MG tablet; Take 1 tablet by mouth Daily.  -     CBC & Differential  -     Comprehensive Metabolic Panel    H/O iron deficiency  -     CBC & Differential  -     Comprehensive Metabolic Panel  -     Iron Profile  -     Ferritin    Hypothyroidism (acquired)  -     CBC & Differential  -     Comprehensive Metabolic Panel  -     TSH+Free T4    Gastritis, presence of bleeding unspecified, unspecified chronicity, unspecified gastritis type  -     omeprazole (priLOSEC) 20 MG capsule; Take 1 capsule by mouth Daily.    Epistaxis      Labs updated today.   Cetirizine added to treatment to improve allergies. Advised to use saline nasal spray and humidifier to improve nose bleeds.     Health advice: healthy food choices with fresh fruits and vegetables, maintain sleep pattern at least 8 hours, avoid texting and distracted driving practices; wear safety belt, engage in regular exercise, maintain healthy weight, use safe sex practices, avoid alcohol and illicit drugs. Maintain immunizations that are up to date. Follow up with PCP if struggling with depression or anxiety. Keep regular dental and eye exams. Brush and floss teeth daily.   F/U annually and prn.

## 2019-11-15 LAB
ALBUMIN SERPL-MCNC: 5.6 G/DL (ref 3.5–5.2)
ALBUMIN/GLOB SERPL: 2.1 G/DL
ALP SERPL-CCNC: 55 U/L (ref 39–117)
ALT SERPL-CCNC: 75 U/L (ref 1–41)
AST SERPL-CCNC: 38 U/L (ref 1–40)
BASOPHILS # BLD AUTO: 0.03 10*3/MM3 (ref 0–0.2)
BASOPHILS NFR BLD AUTO: 0.3 % (ref 0–1.5)
BILIRUB SERPL-MCNC: 0.2 MG/DL (ref 0.2–1.2)
BUN SERPL-MCNC: 7 MG/DL (ref 6–20)
BUN/CREAT SERPL: 7.5 (ref 7–25)
CALCIUM SERPL-MCNC: 10.3 MG/DL (ref 8.6–10.5)
CHLORIDE SERPL-SCNC: 98 MMOL/L (ref 98–107)
CO2 SERPL-SCNC: 28.7 MMOL/L (ref 22–29)
CREAT SERPL-MCNC: 0.93 MG/DL (ref 0.76–1.27)
EOSINOPHIL # BLD AUTO: 0.06 10*3/MM3 (ref 0–0.4)
EOSINOPHIL NFR BLD AUTO: 0.6 % (ref 0.3–6.2)
ERYTHROCYTE [DISTWIDTH] IN BLOOD BY AUTOMATED COUNT: 13.7 % (ref 12.3–15.4)
FERRITIN SERPL-MCNC: 115 NG/ML (ref 30–400)
GLOBULIN SER CALC-MCNC: 2.7 GM/DL
GLUCOSE SERPL-MCNC: 84 MG/DL (ref 65–99)
HCT VFR BLD AUTO: 49 % (ref 37.5–51)
HGB BLD-MCNC: 16.1 G/DL (ref 13–17.7)
IMM GRANULOCYTES # BLD AUTO: 0.04 10*3/MM3 (ref 0–0.05)
IMM GRANULOCYTES NFR BLD AUTO: 0.4 % (ref 0–0.5)
IRON SATN MFR SERPL: 14 % (ref 20–50)
IRON SERPL-MCNC: 67 MCG/DL (ref 59–158)
LYMPHOCYTES # BLD AUTO: 3.44 10*3/MM3 (ref 0.7–3.1)
LYMPHOCYTES NFR BLD AUTO: 31.6 % (ref 19.6–45.3)
MCH RBC QN AUTO: 27 PG (ref 26.6–33)
MCHC RBC AUTO-ENTMCNC: 32.9 G/DL (ref 31.5–35.7)
MCV RBC AUTO: 82.1 FL (ref 79–97)
MONOCYTES # BLD AUTO: 0.73 10*3/MM3 (ref 0.1–0.9)
MONOCYTES NFR BLD AUTO: 6.7 % (ref 5–12)
NEUTROPHILS # BLD AUTO: 6.59 10*3/MM3 (ref 1.7–7)
NEUTROPHILS NFR BLD AUTO: 60.4 % (ref 42.7–76)
NRBC BLD AUTO-RTO: 0 /100 WBC (ref 0–0.2)
PLATELET # BLD AUTO: 350 10*3/MM3 (ref 140–450)
POTASSIUM SERPL-SCNC: 4.8 MMOL/L (ref 3.5–5.2)
PROT SERPL-MCNC: 8.3 G/DL (ref 6–8.5)
RBC # BLD AUTO: 5.97 10*6/MM3 (ref 4.14–5.8)
SODIUM SERPL-SCNC: 141 MMOL/L (ref 136–145)
T4 FREE SERPL-MCNC: 1.21 NG/DL (ref 0.93–1.7)
TIBC SERPL-MCNC: 472 MCG/DL
TSH SERPL DL<=0.005 MIU/L-ACNC: 1.66 UIU/ML (ref 0.27–4.2)
UIBC SERPL-MCNC: 405 MCG/DL (ref 112–346)
WBC # BLD AUTO: 10.89 10*3/MM3 (ref 3.4–10.8)

## 2020-02-17 ENCOUNTER — OFFICE VISIT (OUTPATIENT)
Dept: FAMILY MEDICINE CLINIC | Facility: CLINIC | Age: 20
End: 2020-02-17

## 2020-02-17 VITALS
RESPIRATION RATE: 18 BRPM | HEIGHT: 71 IN | OXYGEN SATURATION: 100 % | HEART RATE: 102 BPM | DIASTOLIC BLOOD PRESSURE: 86 MMHG | WEIGHT: 275 LBS | SYSTOLIC BLOOD PRESSURE: 124 MMHG | BODY MASS INDEX: 38.5 KG/M2 | TEMPERATURE: 98.1 F

## 2020-02-17 DIAGNOSIS — E66.9 OBESITY (BMI 30-39.9): ICD-10-CM

## 2020-02-17 DIAGNOSIS — R63.2 BINGE EATING: ICD-10-CM

## 2020-02-17 DIAGNOSIS — K58.1 IRRITABLE BOWEL SYNDROME WITH CONSTIPATION: ICD-10-CM

## 2020-02-17 DIAGNOSIS — R63.5 WEIGHT GAIN: ICD-10-CM

## 2020-02-17 DIAGNOSIS — R10.30 LOWER ABDOMINAL PAIN: Primary | ICD-10-CM

## 2020-02-17 DIAGNOSIS — E03.9 HYPOTHYROIDISM (ACQUIRED): ICD-10-CM

## 2020-02-17 PROCEDURE — 99214 OFFICE O/P EST MOD 30 MIN: CPT | Performed by: FAMILY MEDICINE

## 2020-02-17 RX ORDER — TOPIRAMATE 50 MG/1
TABLET, FILM COATED ORAL
Qty: 90 TABLET | Refills: 1 | Status: SHIPPED | OUTPATIENT
Start: 2020-02-17 | End: 2020-10-07 | Stop reason: SDUPTHER

## 2020-02-17 NOTE — PROGRESS NOTES
Subjective   Lane Patton is a 19 y.o. male.   Chief Complaint   Patient presents with   • Abdominal Pain     x several months    • Discuss wt gain     eats alot at night    • Labs for Comp Care     forgot paper    Patient with grandmother, Earnest Patton and , Quan Childers.     Abdominal Pain   This is a new problem. The current episode started more than 1 month ago (2 months). The problem occurs every several days. The problem has been waxing and waning. The pain is located in the LLQ and RLQ. The pain is mild. The quality of the pain is cramping. Associated symptoms include constipation. Pertinent negatives include no diarrhea, dysuria, fever, frequency, hematochezia, nausea or vomiting. Nothing aggravates the pain. The pain is relieved by bowel movements. Treatments tried: Miralax  The treatment provided no relief.   He does have IBS-constipation. Previously improved with Linzess. He failed treatment with Miralax.     Hypothyroidism  Lane Patton is a 19 y.o. male who presents for follow up of hypothyroidism. Current symptoms: weight changes . Patient denies diarrhea and heat / cold intolerance. Symptoms have gradually worsened.    His grandmother is concerned about his weight gain. He does tend to binge eat at night.   Previously took Topamax to help with binge eating, would like to resume.     Needing to complete labs for Comp Care, but forgot orders.   Grandmother thinks it was to check liver enzymes.    The following portions of the patient's history were reviewed and updated as appropriate: allergies, current medications, past family history, past medical history, past social history, past surgical history and problem list.    Review of Systems   Constitutional: Positive for unexpected weight gain. Negative for appetite change, chills and fever.   HENT: Negative.    Respiratory: Negative for cough and chest tightness.    Cardiovascular: Negative for chest pain and palpitations.    Gastrointestinal: Positive for abdominal pain and constipation. Negative for diarrhea, hematochezia, nausea and vomiting.   Genitourinary: Negative for dysuria and frequency.   Neurological: Negative for seizures and headache.       Objective   Physical Exam   Constitutional: He appears well-developed and well-nourished.   HENT:   Head: Normocephalic.   Right Ear: External ear normal.   Left Ear: External ear normal.   Nose: Nose normal.   Eyes: Conjunctivae are normal.   Cardiovascular: Normal rate, regular rhythm and normal heart sounds.   No murmur heard.  Pulmonary/Chest: Effort normal and breath sounds normal. He has no wheezes.   Abdominal: Soft. Bowel sounds are decreased. There is generalized tenderness. There is no rigidity and no guarding.   obese   Musculoskeletal: He exhibits no deformity.   Neurological: He is alert.   Skin: Skin is warm and dry.   Psychiatric:   Pt makes random comments about himself in 3rd person   Nursing note and vitals reviewed.        Assessment/Plan   Lane was seen today for abdominal pain, discuss wt gain and labs for comp care.    Diagnoses and all orders for this visit:    Lower abdominal pain  -     CBC & Differential  -     Comprehensive Metabolic Panel  -     CT Abdomen Pelvis Without Contrast    Weight gain  -     TSH+Free T4    Binge eating  -     topiramate (TOPAMAX) 50 MG tablet; Take 1/2 tab po qhs x 1 week, then increase to 1 tab po qhs starting week 2    Hypothyroidism (acquired)  -     TSH+Free T4    Irritable bowel syndrome with constipation  -     linaclotide (LINZESS) 145 MCG capsule capsule; Take 1 capsule by mouth Every Morning Before Breakfast.    Obesity (BMI 30-39.9)    BMI 38.0-38.9,adult      Patient's Body mass index is 38.35 kg/m². BMI is above normal parameters. Recommendations include: nutrition counseling.  Topamax to improve binge eating.   Labs updated today.  He has failed Miralax, will treat abdominal pain and constipation with Linzess. Will  evaluate abdominal pain with CT scan.

## 2020-02-21 LAB
ALBUMIN SERPL-MCNC: 5 G/DL (ref 3.5–5.2)
ALBUMIN/GLOB SERPL: 1.8 G/DL
ALP SERPL-CCNC: 61 U/L (ref 39–117)
ALT SERPL-CCNC: 82 U/L (ref 1–41)
AST SERPL-CCNC: 43 U/L (ref 1–40)
BASOPHILS # BLD AUTO: 0.03 10*3/MM3 (ref 0–0.2)
BASOPHILS NFR BLD AUTO: 0.4 % (ref 0–1.5)
BILIRUB SERPL-MCNC: 0.2 MG/DL (ref 0.2–1.2)
BUN SERPL-MCNC: 8 MG/DL (ref 6–20)
BUN/CREAT SERPL: 7.8 (ref 7–25)
CALCIUM SERPL-MCNC: 9.7 MG/DL (ref 8.6–10.5)
CHLORIDE SERPL-SCNC: 100 MMOL/L (ref 98–107)
CO2 SERPL-SCNC: 26.3 MMOL/L (ref 22–29)
CREAT SERPL-MCNC: 1.02 MG/DL (ref 0.76–1.27)
EOSINOPHIL # BLD AUTO: 0.06 10*3/MM3 (ref 0–0.4)
EOSINOPHIL NFR BLD AUTO: 0.8 % (ref 0.3–6.2)
ERYTHROCYTE [DISTWIDTH] IN BLOOD BY AUTOMATED COUNT: 13.4 % (ref 12.3–15.4)
GLOBULIN SER CALC-MCNC: 2.8 GM/DL
GLUCOSE SERPL-MCNC: 91 MG/DL (ref 65–99)
HCT VFR BLD AUTO: 49.5 % (ref 37.5–51)
HGB BLD-MCNC: 16.1 G/DL (ref 13–17.7)
IMM GRANULOCYTES # BLD AUTO: 0.03 10*3/MM3 (ref 0–0.05)
IMM GRANULOCYTES NFR BLD AUTO: 0.4 % (ref 0–0.5)
LYMPHOCYTES # BLD AUTO: 2.75 10*3/MM3 (ref 0.7–3.1)
LYMPHOCYTES NFR BLD AUTO: 36.3 % (ref 19.6–45.3)
MCH RBC QN AUTO: 27 PG (ref 26.6–33)
MCHC RBC AUTO-ENTMCNC: 32.5 G/DL (ref 31.5–35.7)
MCV RBC AUTO: 82.9 FL (ref 79–97)
MONOCYTES # BLD AUTO: 0.64 10*3/MM3 (ref 0.1–0.9)
MONOCYTES NFR BLD AUTO: 8.5 % (ref 5–12)
NEUTROPHILS # BLD AUTO: 4.06 10*3/MM3 (ref 1.7–7)
NEUTROPHILS NFR BLD AUTO: 53.6 % (ref 42.7–76)
NRBC BLD AUTO-RTO: 0 /100 WBC (ref 0–0.2)
PLATELET # BLD AUTO: 302 10*3/MM3 (ref 140–450)
POTASSIUM SERPL-SCNC: 5.4 MMOL/L (ref 3.5–5.2)
PROT SERPL-MCNC: 7.8 G/DL (ref 6–8.5)
RBC # BLD AUTO: 5.97 10*6/MM3 (ref 4.14–5.8)
SODIUM SERPL-SCNC: 140 MMOL/L (ref 136–145)
T4 FREE SERPL-MCNC: 1.24 NG/DL (ref 0.93–1.7)
TSH SERPL DL<=0.005 MIU/L-ACNC: 1.21 UIU/ML (ref 0.27–4.2)
WBC # BLD AUTO: 7.57 10*3/MM3 (ref 3.4–10.8)

## 2020-02-25 ENCOUNTER — HOSPITAL ENCOUNTER (OUTPATIENT)
Dept: CT IMAGING | Facility: HOSPITAL | Age: 20
Discharge: HOME | End: 2020-02-25
Admitting: FAMILY MEDICINE

## 2020-02-25 PROCEDURE — 74176 CT ABD & PELVIS W/O CONTRAST: CPT

## 2020-04-27 DIAGNOSIS — J30.1 NON-SEASONAL ALLERGIC RHINITIS DUE TO POLLEN: ICD-10-CM

## 2020-04-27 RX ORDER — MONTELUKAST SODIUM 10 MG/1
TABLET ORAL
Qty: 90 TABLET | Refills: 4 | Status: SHIPPED | OUTPATIENT
Start: 2020-04-27 | End: 2021-05-10

## 2020-05-18 DIAGNOSIS — K29.70 GASTRITIS, PRESENCE OF BLEEDING UNSPECIFIED, UNSPECIFIED CHRONICITY, UNSPECIFIED GASTRITIS TYPE: ICD-10-CM

## 2020-05-19 RX ORDER — OMEPRAZOLE 20 MG/1
CAPSULE, DELAYED RELEASE ORAL
Qty: 90 CAPSULE | Refills: 0 | Status: SHIPPED | OUTPATIENT
Start: 2020-05-19 | End: 2020-08-18

## 2020-08-01 DIAGNOSIS — E03.9 HYPOTHYROIDISM (ACQUIRED): ICD-10-CM

## 2020-08-03 RX ORDER — LEVOTHYROXINE SODIUM 88 UG/1
TABLET ORAL
Qty: 90 TABLET | Refills: 1 | Status: SHIPPED | OUTPATIENT
Start: 2020-08-03 | End: 2021-03-25

## 2020-08-07 ENCOUNTER — TELEPHONE (OUTPATIENT)
Dept: FAMILY MEDICINE CLINIC | Facility: CLINIC | Age: 20
End: 2020-08-07

## 2020-08-07 RX ORDER — GUAIFENESIN 600 MG/1
600 TABLET, EXTENDED RELEASE ORAL 2 TIMES DAILY
Qty: 20 TABLET | Refills: 0 | Status: SHIPPED | OUTPATIENT
Start: 2020-08-07 | End: 2020-08-17

## 2020-08-07 RX ORDER — HYDROXYZINE HYDROCHLORIDE 25 MG/1
25 TABLET, FILM COATED ORAL EVERY 8 HOURS PRN
Qty: 30 TABLET | Refills: 0 | Status: SHIPPED | OUTPATIENT
Start: 2020-08-07

## 2020-08-07 NOTE — TELEPHONE ENCOUNTER
Earnest Patton in office today with another patient. Reports that Lane has had sore throat, sinus congestion, and drainage x 3 days, symptoms worsening.   No fever   Not treating symptoms with anything at this time.   Treating with cough drops without improvement of symptoms.  Has been staying in to avoid COVID .   Will treat symptomatically with Mucinex and hydroxyzine.  If symptoms persist, recommended that they take patient to be screened for COVID.

## 2020-08-17 DIAGNOSIS — K29.70 GASTRITIS, PRESENCE OF BLEEDING UNSPECIFIED, UNSPECIFIED CHRONICITY, UNSPECIFIED GASTRITIS TYPE: ICD-10-CM

## 2020-08-18 RX ORDER — OMEPRAZOLE 20 MG/1
CAPSULE, DELAYED RELEASE ORAL
Qty: 90 CAPSULE | Refills: 1 | Status: SHIPPED | OUTPATIENT
Start: 2020-08-18 | End: 2021-02-24

## 2020-10-07 DIAGNOSIS — R63.2 BINGE EATING: ICD-10-CM

## 2020-10-07 RX ORDER — TOPIRAMATE 50 MG/1
50 TABLET, FILM COATED ORAL NIGHTLY
Qty: 90 TABLET | Refills: 1 | Status: SHIPPED | OUTPATIENT
Start: 2020-10-07 | End: 2021-04-09

## 2020-10-07 NOTE — TELEPHONE ENCOUNTER
Caller: Jn Patton    Relationship: [employer]    Best call back number: [phone]      Medication needed:   Requested Prescriptions     Pending Prescriptions Disp Refills   • topiramate (Topamax) 50 MG tablet 90 tablet 1     Sig: Take 1/2 tab po qhs x 1 week, then increase to 1 tab po qhs starting week 2     What details did the patient provide when requesting the medication: TOTALLY OUT OF MEDICATION    Does the patient have less than a 3 day supply:  [x] Yes  [] No    What is the patient[place] [facility] [place] [address] [phone] [phone]

## 2021-01-17 DIAGNOSIS — J30.89 ALLERGIC RHINITIS DUE TO OTHER ALLERGIC TRIGGER, UNSPECIFIED SEASONALITY: ICD-10-CM

## 2021-01-18 ENCOUNTER — OFFICE VISIT (OUTPATIENT)
Dept: FAMILY MEDICINE CLINIC | Facility: CLINIC | Age: 21
End: 2021-01-18

## 2021-01-18 VITALS
BODY MASS INDEX: 37.02 KG/M2 | WEIGHT: 264.4 LBS | SYSTOLIC BLOOD PRESSURE: 102 MMHG | OXYGEN SATURATION: 97 % | TEMPERATURE: 97.3 F | DIASTOLIC BLOOD PRESSURE: 80 MMHG | HEART RATE: 85 BPM | RESPIRATION RATE: 16 BRPM | HEIGHT: 71 IN

## 2021-01-18 DIAGNOSIS — B07.8 COMMON WART: ICD-10-CM

## 2021-01-18 DIAGNOSIS — R04.0 EPISTAXIS: Primary | ICD-10-CM

## 2021-01-18 DIAGNOSIS — T14.8XXA WOUND OF SKIN: ICD-10-CM

## 2021-01-18 DIAGNOSIS — L21.9 SEBORRHEIC DERMATITIS: ICD-10-CM

## 2021-01-18 PROCEDURE — 99214 OFFICE O/P EST MOD 30 MIN: CPT | Performed by: FAMILY MEDICINE

## 2021-01-18 RX ORDER — CETIRIZINE HYDROCHLORIDE 10 MG/1
TABLET ORAL
Qty: 90 TABLET | Refills: 2 | Status: SHIPPED | OUTPATIENT
Start: 2021-01-18 | End: 2021-12-10

## 2021-01-18 RX ORDER — KETOCONAZOLE 2 G/100G
AEROSOL, FOAM TOPICAL
Qty: 100 G | Refills: 0 | Status: SHIPPED | OUTPATIENT
Start: 2021-01-18 | End: 2022-08-05

## 2021-01-18 NOTE — PATIENT INSTRUCTIONS
Nosebleed, Adult  A nosebleed is when blood comes out of the nose. Nosebleeds are common. Usually, they are not a sign of a serious condition.  Nosebleeds can happen if a small blood vessel in your nose starts to bleed or if the lining of your nose (mucous membrane) cracks. They are commonly caused by:  · Allergies.  · Colds.  · Picking your nose.  · Blowing your nose too hard.  · An injury from sticking an object into your nose or getting hit in the nose.  · Dry or cold air.  Less common causes of nosebleeds include:  · Toxic fumes.  · Something abnormal in the nose or in the air-filled spaces in the bones of the face (sinuses).  · Growths in the nose, such as polyps.  · Medicines or conditions that cause blood to clot slowly.  · Certain illnesses or procedures that irritate or dry out the nasal passages.  Follow these instructions at home:  When you have a nosebleed:    · Sit down and tilt your head slightly forward.  · Use a clean towel or tissue to pinch your nostrils under the bony part of your nose. After 10 minutes, let go of your nose and see if bleeding starts again. Do not release pressure before that time. If there is still bleeding, repeat the pinching and holding for 10 minutes until the bleeding stops.  · Do not place tissues or gauze in the nose to stop bleeding.  · Avoid lying down and avoid tilting your head backward. That may make blood collect in the throat and cause gagging or coughing.  · Use a nasal spray decongestant to help with a nosebleed as told by your health care provider.  · Do not use petroleum jelly or mineral oil in your nose. It can drip into your lungs.  After a nosebleed:  · Avoid blowing your nose or sniffing for a number of hours.  · Avoid straining, lifting, or bending at the waist for several days. You may resume other normal activities as you are able.  · Use saline spray or a humidifier as told by your health care provider.  · Aspirin and blood thinners make bleeding more  likely. If you are prescribed these medicines and you suffer from nosebleeds:  ? Ask your health care provider if you should stop taking the medicines or if you should adjust the dose.  ? Do not stop taking medicines that your health care provider has recommended unless told by your health care provider.  · If your nosebleed was caused by dry mucous membranes, use over-the-counter saline nasal spray or gel. This will keep the mucous membranes moist and allow them to heal. If you must use a lubricant:  ? Choose one that is water-soluble.  ? Use only as much as you need and use it only as often as needed.  ? Do not lie down until several hours after you use it.  Contact a health care provider if:  · You have a fever.  · You get nosebleeds often or more often than usual.  · You bruise very easily.  · You have a nosebleed from having something stuck in your nose.  · You have bleeding in your mouth.  · You vomit or cough up brown material.  · You have a nosebleed after you start a new medicine.  Get help right away if:  · You have a nosebleed after a fall or a head injury.  · Your nosebleed does not go away after 20 minutes.  · You feel dizzy or weak.  · You have unusual bleeding from other parts of your body.  · You have unusual bruising on other parts of your body.  · You become sweaty.  · You vomit blood.  This information is not intended to replace advice given to you by your health care provider. Make sure you discuss any questions you have with your health care provider.  Document Revised: 03/19/2019 Document Reviewed: 07/04/2017  Elsevier Patient Education © 2020 [employer]

## 2021-01-18 NOTE — PROGRESS NOTES
"Chief Complaint  Nose Bleed and Suspicious Skin Lesion (forearm and back)    Subjective          Lane Patton presents to [facility] for   History of Present Illness  Present with mom today  He is having frequent nose bleeds  Occurring almost every night   Mom has treated with Afrin nasal spray, applied with a qtip. Hasn't improved symptoms.     Flaky and red skin on forehead and eyebrows.   Thinks it might be psoriasis  Not applying any treatment     Has a skin lesion on back, right side. He picks at it often, won't heal. Has tried applying neosporin to the area, but not resolving.     Also, a rough, round, raised lesion on right forearm.     Objective   Vital Signs:   /80   Pulse 85   Temp 97.3 °F (36.3 °C)   Resp 16   Ht 180.3 cm (70.98\")   Wt 120 kg (264 lb 6.4 oz)   SpO2 97%   BMI 36.89 kg/m²     Physical Exam  Vitals signs and nursing note reviewed.   Constitutional:       Appearance: He is well-developed.   HENT:      Head: Normocephalic and atraumatic.      Nose: Nose normal.   Eyes:      Conjunctiva/sclera: Conjunctivae normal.   Cardiovascular:      Rate and Rhythm: Normal rate and regular rhythm.      Heart sounds: Normal heart sounds.   Pulmonary:      Effort: Pulmonary effort is normal. No respiratory distress.      Breath sounds: Normal breath sounds.   Musculoskeletal:         General: No deformity.   Skin:     General: Skin is warm.          Neurological:      Mental Status: He is alert and oriented to person, place, and time.        Result Review :          Cryotherapy, Skin Lesion    Date/Time: 1/18/2021 4:43 AM  Performed by: Caity Simmons DO  Authorized by: Caity Simmons DO   Consent: Verbal consent obtained.  Risks and benefits: risks, benefits and alternatives were discussed  Consent given by: patient and parent  Patient understanding: patient states understanding of the procedure being performed  Patient consent: the patient's " understanding of the procedure matches consent given  Procedure consent: procedure consent matches procedure scheduled  Patient identity confirmed: verbally with patient  Local anesthesia used: no    Anesthesia:  Local anesthesia used: no    Sedation:  Patient sedated: no    Patient tolerance: patient tolerated the procedure well with no immediate complications  Comments: Cryotherapy to treat wart on right forearm. Performed 3 separate sessions on lesion, allowing time to that in between.             Assessment and Plan    Problem List Items Addressed This Visit     None      Visit Diagnoses     Epistaxis    -  Primary    Common wart        Seborrheic dermatitis        Relevant Medications    Ketoconazole 2 % foam    mupirocin (BACTROBAN) 2 % ointment    Wound of skin        Relevant Medications    Ketoconazole 2 % foam    mupirocin (BACTROBAN) 2 % ointment    Other Relevant Orders    Cryotherapy, Skin Lesion      Advised to stop picking at lesion on back to allow for healing.   Keep fingernails short to prevent scratching and nose bleeds.   Encouraged nasal saline spray or gel to moisturize nose and prevent bleeds. Use air humidifier. Avoid objects in nose. Consider ENT if nose bleeds persist.   Cryotherapy performed on right forearm today. He tolerated well    Follow Up   Return if symptoms worsen or fail to improve.  Patient was given instructions and counseling regarding his condition or for health maintenance advice. Please see specific information pulled into the AVS if appropriate.

## 2021-02-24 DIAGNOSIS — K29.70 GASTRITIS, PRESENCE OF BLEEDING UNSPECIFIED, UNSPECIFIED CHRONICITY, UNSPECIFIED GASTRITIS TYPE: ICD-10-CM

## 2021-02-24 RX ORDER — OMEPRAZOLE 20 MG/1
CAPSULE, DELAYED RELEASE ORAL
Qty: 90 CAPSULE | Refills: 2 | Status: SHIPPED | OUTPATIENT
Start: 2021-02-24 | End: 2021-07-23 | Stop reason: SDUPTHER

## 2021-03-25 DIAGNOSIS — E03.9 HYPOTHYROIDISM (ACQUIRED): ICD-10-CM

## 2021-03-25 RX ORDER — LEVOTHYROXINE SODIUM 88 UG/1
TABLET ORAL
Qty: 90 TABLET | Refills: 0 | Status: SHIPPED | OUTPATIENT
Start: 2021-03-25 | End: 2021-07-18

## 2021-04-01 ENCOUNTER — OFFICE VISIT (OUTPATIENT)
Dept: FAMILY MEDICINE CLINIC | Facility: CLINIC | Age: 21
End: 2021-04-01

## 2021-04-01 VITALS
SYSTOLIC BLOOD PRESSURE: 106 MMHG | RESPIRATION RATE: 18 BRPM | HEIGHT: 71 IN | WEIGHT: 270 LBS | HEART RATE: 84 BPM | BODY MASS INDEX: 37.8 KG/M2 | TEMPERATURE: 99.3 F | DIASTOLIC BLOOD PRESSURE: 74 MMHG

## 2021-04-01 DIAGNOSIS — E03.9 HYPOTHYROIDISM (ACQUIRED): Primary | ICD-10-CM

## 2021-04-01 DIAGNOSIS — H61.21 IMPACTED CERUMEN OF RIGHT EAR: ICD-10-CM

## 2021-04-01 DIAGNOSIS — L40.9 PSORIASIS: ICD-10-CM

## 2021-04-01 PROBLEM — J02.9 SORE THROAT: Status: ACTIVE | Noted: 2021-04-01

## 2021-04-01 PROBLEM — F84.0 ACTIVE AUTISTIC DISORDER: Status: ACTIVE | Noted: 2021-04-01

## 2021-04-01 PROBLEM — J01.90 ACUTE INFECTION OF NASAL SINUS: Status: ACTIVE | Noted: 2021-04-01

## 2021-04-01 PROBLEM — B49 DISEASE CAUSED BY FUNGUS: Status: ACTIVE | Noted: 2021-04-01

## 2021-04-01 PROBLEM — J06.9 INFECTION OF THE UPPER RESPIRATORY TRACT: Status: ACTIVE | Noted: 2021-04-01

## 2021-04-01 PROBLEM — B09 VIRAL EXANTHEM: Status: ACTIVE | Noted: 2021-04-01

## 2021-04-01 PROBLEM — N39.0 INFECTION OF URINARY TRACT: Status: ACTIVE | Noted: 2021-04-01

## 2021-04-01 PROBLEM — R42 HEAD REVOLVING AROUND: Status: ACTIVE | Noted: 2021-04-01

## 2021-04-01 PROBLEM — R11.2 NAUSEA AND VOMITING: Status: ACTIVE | Noted: 2021-04-01

## 2021-04-01 PROBLEM — R50.9 FEVER: Status: ACTIVE | Noted: 2021-04-01

## 2021-04-01 PROBLEM — K29.00 ACUTE GASTRITIS: Status: ACTIVE | Noted: 2021-04-01

## 2021-04-01 PROCEDURE — 99213 OFFICE O/P EST LOW 20 MIN: CPT | Performed by: FAMILY MEDICINE

## 2021-04-01 RX ORDER — DIAPER,BRIEF,INFANT-TODD,DISP
EACH MISCELLANEOUS 2 TIMES DAILY
Qty: 28.35 G | Refills: 0 | Status: SHIPPED | OUTPATIENT
Start: 2021-04-01

## 2021-04-01 NOTE — PATIENT INSTRUCTIONS
Go to the nearest ER or return to clinic if symptoms worsen, fever/chill develop    Hypothyroidism    Hypothyroidism is when the thyroid gland does not make enough of certain hormones (it is underactive). The thyroid gland is a small gland located in the lower front part of the neck, just in front of the windpipe (trachea). This gland makes hormones that help control how the body uses food for energy (metabolism) as well as how the heart and brain function. These hormones also play a role in keeping your bones strong. When the thyroid is underactive, it produces too little of the hormones thyroxine (T4) and triiodothyronine (T3).  What are the causes?  This condition may be caused by:  · Hashimoto's disease. This is a disease in which the body's disease-fighting system (immune system) attacks the thyroid gland. This is the most common cause.  · Viral infections.  · Pregnancy.  · Certain medicines.  · Birth defects.  · Past radiation treatments to the head or neck for cancer.  · Past treatment with radioactive iodine.  · Past exposure to radiation in the environment.  · Past surgical removal of part or all of the thyroid.  · Problems with a gland in the center of the brain (pituitary gland).  · Lack of enough iodine in the diet.  What increases the risk?  You are more likely to develop this condition if:  · You are female.  · You have a family history of thyroid conditions.  · You use a medicine called lithium.  · You take medicines that affect the immune system (immunosuppressants).  What are the signs or symptoms?  Symptoms of this condition include:  · Feeling as though you have no energy (lethargy).  · Not being able to tolerate cold.  · Weight gain that is not explained by a change in diet or exercise habits.  · Lack of appetite.  · Dry skin.  · Coarse hair.  · Menstrual irregularity.  · Slowing of thought processes.  · Constipation.  · Sadness or depression.  How is this diagnosed?  This condition may be  diagnosed based on:  · Your symptoms, your medical history, and a physical exam.  · Blood tests.  You may also have imaging tests, such as an ultrasound or MRI.  How is this treated?  This condition is treated with medicine that replaces the thyroid hormones that your body does not make. After you begin treatment, it may take several weeks for symptoms to go away.  Follow these instructions at home:  · Take over-the-counter and prescription medicines only as told by your health care provider.  · If you start taking any new medicines, tell your health care provider.  · Keep all follow-up visits as told by your health care provider. This is important.  ? As your condition improves, your dosage of thyroid hormone medicine may change.  ? You will need to have blood tests regularly so that your health care provider can monitor your condition.  Contact a health care provider if:  · Your symptoms do not get better with treatment.  · You are taking thyroid replacement medicine and you:  ? Sweat a lot.  ? Have tremors.  ? Feel anxious.  ? Lose weight rapidly.  ? Cannot tolerate heat.  ? Have emotional swings.  ? Have diarrhea.  ? Feel weak.  Get help right away if you have:  · Chest pain.  · An irregular heartbeat.  · A rapid heartbeat.  · Difficulty breathing.  Summary  · Hypothyroidism is when the thyroid gland does not make enough of certain hormones (it is underactive).  · When the thyroid is underactive, it produces too little of the hormones thyroxine (T4) and triiodothyronine (T3).  · The most common cause is Hashimoto's disease, a disease in which the body's disease-fighting system (immune system) attacks the thyroid gland. The condition can also be caused by viral infections, medicine, pregnancy, or past radiation treatment to the head or neck.  · Symptoms may include weight gain, dry skin, constipation, feeling as though you do not have energy, and not being able to tolerate cold.  · This condition is treated with  medicine to replace the thyroid hormones that your body does not make.  This information is not intended to replace advice given to you by your health care provider. Make sure you discuss any questions you have with your health care provider.  Document Revised: 11/30/2018 Document Reviewed: 11/28/2018  Elsevier Patient Education © 2021 [employer]

## 2021-04-01 NOTE — PROGRESS NOTES
"Chief Complaint  2mo f/u Thyroid    Subjective          Lane Patton presents to [facility]  History of Present Illness  Hypothyroidism  Lane Patton is a 20 y.o. male who presents for follow up of hypothyroidism. Current symptoms: none . Patient denies change in energy level and diarrhea. Symptoms have been well-controlled.    He has 2 white areas on his forehead and surrounding skin is red x 2 days. No excessive sun exposure in recent days or weeks, stays inside mostly.   He denies skin irritation   Has a rash behind right ear, red and flaky skin.    Complains that right ear bothers him. Mom says they have difficulty cleaning his ears.    The following portions of the patient's history were reviewed and updated as appropriate: allergies, current medications, past family history, past medical history, past social history, past surgical history and problem list.    Objective   Vital Signs:   /74   Pulse 84   Temp 99.3 °F (37.4 °C)   Resp 18   Ht 180.3 cm (70.98\")   Wt 122 kg (270 lb)   BMI 37.68 kg/m²     Physical Exam  Vitals and nursing note reviewed.   Constitutional:       Appearance: He is well-developed. He is obese.   HENT:      Head: Normocephalic and atraumatic.      Right Ear: Tympanic membrane, ear canal and external ear normal.      Left Ear: There is impacted cerumen.      Nose: Nose normal.   Eyes:      Conjunctiva/sclera: Conjunctivae normal.   Cardiovascular:      Rate and Rhythm: Normal rate and regular rhythm.      Heart sounds: Normal heart sounds.   Pulmonary:      Effort: Pulmonary effort is normal.      Breath sounds: Normal breath sounds. No wheezing or rhonchi.   Musculoskeletal:         General: No deformity.      Cervical back: Neck supple.   Skin:     General: Skin is warm and dry.      Findings: Rash (Postauricular right ear erythematous base with thick, flaky skin) present.   Neurological:      Mental Status: He is alert. Mental status is " at baseline.   Psychiatric:         Mood and Affect: Mood normal.        Result Review :                 Assessment and Plan    Diagnoses and all orders for this visit:    1. Hypothyroidism (acquired) (Primary)  -     CBC & Differential  -     Comprehensive Metabolic Panel  -     TSH+Free T4    2. Psoriasis  -     CBC & Differential  -     Comprehensive Metabolic Panel  -     hydrocortisone 0.5 % cream; Apply  topically to the appropriate area as directed 2 (Two) Times a Day.  Dispense: 28.35 g; Refill: 0  -     Ambulatory Referral to Dermatology    3. Impacted cerumen of right ear  -     CBC & Differential  -     Comprehensive Metabolic Panel  -     Ambulatory Referral to ENT (Otolaryngology)    Labs updated today.  Topical steroid cream to treat skin.  Referred to dermatology for further evaluation and treatment.  Referred to ENT for cerumen removal right ear.    Follow Up   Return if symptoms worsen or fail to improve.  Patient was given instructions and counseling regarding his condition or for health maintenance advice. Please see specific information pulled into the AVS if appropriate.

## 2021-04-02 LAB
ALBUMIN SERPL-MCNC: 5 G/DL (ref 4.1–5.2)
ALBUMIN/GLOB SERPL: 1.7 {RATIO} (ref 1.2–2.2)
ALP SERPL-CCNC: 61 IU/L (ref 39–117)
ALT SERPL-CCNC: 63 IU/L (ref 0–44)
AST SERPL-CCNC: 34 IU/L (ref 0–40)
BASOPHILS # BLD AUTO: 0.1 X10E3/UL (ref 0–0.2)
BASOPHILS NFR BLD AUTO: 1 %
BILIRUB SERPL-MCNC: 0.2 MG/DL (ref 0–1.2)
BUN SERPL-MCNC: 9 MG/DL (ref 6–20)
BUN/CREAT SERPL: 8 (ref 9–20)
CALCIUM SERPL-MCNC: 9.8 MG/DL (ref 8.7–10.2)
CHLORIDE SERPL-SCNC: 103 MMOL/L (ref 96–106)
CO2 SERPL-SCNC: 21 MMOL/L (ref 20–29)
CREAT SERPL-MCNC: 1.08 MG/DL (ref 0.76–1.27)
EOSINOPHIL # BLD AUTO: 0.1 X10E3/UL (ref 0–0.4)
EOSINOPHIL NFR BLD AUTO: 1 %
ERYTHROCYTE [DISTWIDTH] IN BLOOD BY AUTOMATED COUNT: 13.3 % (ref 11.6–15.4)
GLOBULIN SER CALC-MCNC: 2.9 G/DL (ref 1.5–4.5)
GLUCOSE SERPL-MCNC: 92 MG/DL (ref 65–99)
HCT VFR BLD AUTO: 46.9 % (ref 37.5–51)
HGB BLD-MCNC: 16.3 G/DL (ref 13–17.7)
IMM GRANULOCYTES # BLD AUTO: 0 X10E3/UL (ref 0–0.1)
IMM GRANULOCYTES NFR BLD AUTO: 0 %
LYMPHOCYTES # BLD AUTO: 3.3 X10E3/UL (ref 0.7–3.1)
LYMPHOCYTES NFR BLD AUTO: 36 %
MCH RBC QN AUTO: 28.1 PG (ref 26.6–33)
MCHC RBC AUTO-ENTMCNC: 34.8 G/DL (ref 31.5–35.7)
MCV RBC AUTO: 81 FL (ref 79–97)
MONOCYTES # BLD AUTO: 0.6 X10E3/UL (ref 0.1–0.9)
MONOCYTES NFR BLD AUTO: 6 %
NEUTROPHILS # BLD AUTO: 5.1 X10E3/UL (ref 1.4–7)
NEUTROPHILS NFR BLD AUTO: 56 %
PLATELET # BLD AUTO: 345 X10E3/UL (ref 150–450)
POTASSIUM SERPL-SCNC: 4.6 MMOL/L (ref 3.5–5.2)
PROT SERPL-MCNC: 7.9 G/DL (ref 6–8.5)
RBC # BLD AUTO: 5.81 X10E6/UL (ref 4.14–5.8)
SODIUM SERPL-SCNC: 140 MMOL/L (ref 134–144)
T4 FREE SERPL-MCNC: 1.12 NG/DL (ref 0.82–1.77)
TSH SERPL DL<=0.005 MIU/L-ACNC: 1.4 UIU/ML (ref 0.45–4.5)
WBC # BLD AUTO: 9.1 X10E3/UL (ref 3.4–10.8)

## 2021-04-08 DIAGNOSIS — R63.2 BINGE EATING: ICD-10-CM

## 2021-04-09 RX ORDER — TOPIRAMATE 50 MG/1
TABLET, FILM COATED ORAL
Qty: 90 TABLET | Refills: 3 | Status: SHIPPED | OUTPATIENT
Start: 2021-04-09 | End: 2022-04-29

## 2021-05-09 DIAGNOSIS — J30.1 NON-SEASONAL ALLERGIC RHINITIS DUE TO POLLEN: ICD-10-CM

## 2021-05-10 RX ORDER — MONTELUKAST SODIUM 10 MG/1
TABLET ORAL
Qty: 90 TABLET | Refills: 3 | Status: SHIPPED | OUTPATIENT
Start: 2021-05-10 | End: 2022-06-03

## 2021-07-16 DIAGNOSIS — E03.9 HYPOTHYROIDISM (ACQUIRED): ICD-10-CM

## 2021-07-18 RX ORDER — LEVOTHYROXINE SODIUM 88 UG/1
TABLET ORAL
Qty: 90 TABLET | Refills: 1 | Status: SHIPPED | OUTPATIENT
Start: 2021-07-18 | End: 2021-11-12

## 2021-07-23 ENCOUNTER — OFFICE VISIT (OUTPATIENT)
Dept: FAMILY MEDICINE CLINIC | Facility: CLINIC | Age: 21
End: 2021-07-23

## 2021-07-23 VITALS
HEART RATE: 92 BPM | HEIGHT: 71 IN | OXYGEN SATURATION: 99 % | WEIGHT: 264.6 LBS | BODY MASS INDEX: 37.04 KG/M2 | DIASTOLIC BLOOD PRESSURE: 80 MMHG | SYSTOLIC BLOOD PRESSURE: 120 MMHG | RESPIRATION RATE: 20 BRPM | TEMPERATURE: 98.9 F

## 2021-07-23 DIAGNOSIS — L40.9 PSORIASIS: ICD-10-CM

## 2021-07-23 DIAGNOSIS — Z11.59 NEED FOR HEPATITIS C SCREENING TEST: ICD-10-CM

## 2021-07-23 DIAGNOSIS — R73.03 PREDIABETES: ICD-10-CM

## 2021-07-23 DIAGNOSIS — K29.70 GASTRITIS, PRESENCE OF BLEEDING UNSPECIFIED, UNSPECIFIED CHRONICITY, UNSPECIFIED GASTRITIS TYPE: Primary | ICD-10-CM

## 2021-07-23 DIAGNOSIS — E78.2 MIXED HYPERLIPIDEMIA: ICD-10-CM

## 2021-07-23 DIAGNOSIS — E03.9 HYPOTHYROIDISM (ACQUIRED): ICD-10-CM

## 2021-07-23 DIAGNOSIS — K59.00 CONSTIPATION, UNSPECIFIED CONSTIPATION TYPE: ICD-10-CM

## 2021-07-23 DIAGNOSIS — M25.50 MULTIPLE JOINT PAIN: ICD-10-CM

## 2021-07-23 PROCEDURE — 99214 OFFICE O/P EST MOD 30 MIN: CPT | Performed by: FAMILY MEDICINE

## 2021-07-23 RX ORDER — OMEPRAZOLE 40 MG/1
40 CAPSULE, DELAYED RELEASE ORAL DAILY
Qty: 90 CAPSULE | Refills: 1 | Status: SHIPPED | OUTPATIENT
Start: 2021-07-23 | End: 2022-07-07

## 2021-07-23 NOTE — PATIENT INSTRUCTIONS
Psoriasis  Psoriasis is a long-term (chronic) skin condition. It occurs because your body's defense system (immune system) causes skin cells to form too quickly. This causes raised, red patches (plaques) on your skin that look silvery. The patches may be on all areas of your body. They can be any size or shape.  Psoriasis can come and go. It can range from mild to very bad. It cannot be passed from one person to another (is not contagious). There is no cure for this condition, but it can be helped with treatment.  What are the causes?  The cause of psoriasis is not known. Some things can make it worse. These are:  · Skin damage, such as cuts, scrapes, sunburn, and dryness.  · Not getting enough sunlight.  · Some medicines.  · Alcohol.  · Tobacco.  · Stress.  · Infections.  What increases the risk?  · Having a family member with psoriasis.  · Being very overweight (obese).  · Being 20-40 years old.  · Taking certain medicines.  What are the signs or symptoms?  There are different types of psoriasis. The types are:  · Plaque. This is the most common. Symptoms include red, raised patches with a silvery coating. These may be itchy. Your nails may be crumbly or fall off.  · Guttate. Symptoms include small red spots on your stomach area, arms, and legs. These may happen after you have been sick, such as with strep throat.  · Inverse. Symptoms include patches in your armpits, under your breasts, private areas, or on your butt.  · Pustular. Symptoms include pus-filled bumps on the palms of your hands or the soles of your feet. You also may feel very tired, weak, have a fever, and not be hungry.  · Erythrodermic. Symptoms include bright red skin that looks burned. You may have a fast heartbeat and a body temperature that is too high or too low. You may be itchy or in pain.  · Sebopsoriasis. Symptoms include red patches on your scalp, forehead, and face that are greasy.  · Psoriatic arthritis. Symptoms include swollen,  painful joints along with scaly skin patches.  How is this treated?  There is no cure for this condition, but treatment can:  · Help your skin heal.  · Lessen itching and irritation and swelling (inflammation).  · Slow the growth of new skin cells.  · Help your body's defense system respond better to your skin.  Treatment may include:  · Creams or ointments.  · Light therapy. This may include natural sunlight or light therapy in a doctor's office.  · Medicines. These can help your body better manage skin cells. They may be used with light therapy or ointments. Medicines may include pills or injections. You may also get antibiotic medicines if you have an infection.  Follow these instructions at home:  Skin Care  · Apply lotion to your skin as needed. Only use those that your doctor has said are okay.  · Apply cool, wet cloths (cold compresses) to the affected areas.  · Do not use a hot tub or take hot showers. Use slightly warm, not hot, water when taking showers and baths.  · Do not scratch your skin.  Lifestyle    · Do not use any products that contain nicotine or tobacco, such as cigarettes, e-cigarettes, and chewing tobacco. If you need help quitting, ask your doctor.  · Lower your stress.  · Keep a healthy weight.  · Go out in the sun as told by your doctor. Do not get sunburned.  · Join a support group.  Medicines  · Take or use over-the-counter and prescription medicines only as told by your doctor.  · If you were prescribed an antibiotic medicine, take it as told by your doctor. Do not stop using the antibiotic even if you start to feel better.  Alcohol use  If you drink alcohol:  · Limit how much you use:  ? 0-1 drink a day for women.  ? 0-2 drinks a day for men.  · Be aware of how much alcohol is in your drink. In the [place] one drink equals one 12 oz bottle of beer (355 mL), one 5 oz glass of wine (148 mL), or one 1½ oz glass of hard liquor (44 mL).  General instructions  · Keep a journal to track the  things that cause symptoms (triggers). Try to avoid these things.  · See a counselor if you feel the support would help.  · Keep all follow-up visits as told by your doctor. This is important.  Contact a doctor if:  · You have a fever.  · Your pain gets worse.  · You have more redness or warmth in the affected areas.  · You have new or worse pain or stiffness in your joints.  · Your nails start to break easily or pull away from the nail bed.  · You feel very sad (depressed).  Summary  · Psoriasis is a long-term (chronic) skin condition.  · There is no cure for this condition, but treatment can help manage it.  · Keep a journal to track the things that cause symptoms.  · Take or use over-the-counter and prescription medicines only as told by your doctor.  · Keep all follow-up visits as told by your doctor. This is important.  This information is not intended to replace advice given to you by your health care provider. Make sure you discuss any questions you have with your health care provider.  Document Revised: 10/22/2019 Document Reviewed: 10/22/2019  Elsevier Patient Education © 2021 [employer].

## 2021-07-23 NOTE — PROGRESS NOTES
"Chief Complaint  Follow-up, Hypothyroidism, Diabetes, and Psoriasis    Subjective          Lane Patton presents to [facility]  History of Present Illness  He is present with Earnest Patton today, she provides majority of history.     He states that he is having stomach aches and aching \"all over\" x 2 weeks.   Random joints are painful, no injury. Denies swelling.  Stomach is upset, described as \"turning\".  He belches often.  Taking omeprazole 20 mg daily, but unsure if this is controlling acid reflux.    Biological mother is requesting that labs be completed today to follow-up on hypothyroidism and prediabetes.    He has psoriasis, continues to have dry patches of skin on face and bilateral ears.  He also has dry skin in bilateral axilla.  He was previously referred to dermatology, but did not go to the appointment.  His mother is questioning if the appointment was scheduled.  He was also referred to ENT for cerumen impaction treatment, did not give his appointment either.    The following portions of the patient's history were reviewed and updated as appropriate: allergies, current medications, past family history, past medical history, past social history, past surgical history and problem list.    Objective   Vital Signs:   /80   Pulse 92   Temp 98.9 °F (37.2 °C)   Resp 20   Ht 180.3 cm (70.98\")   Wt 120 kg (264 lb 9.6 oz)   SpO2 99%   BMI 36.92 kg/m²     Physical Exam  Vitals and nursing note reviewed.   Constitutional:       Appearance: He is well-developed.   HENT:      Head: Normocephalic and atraumatic.      Right Ear: There is impacted cerumen.      Left Ear: There is impacted cerumen.      Nose: Nose normal.   Eyes:      Conjunctiva/sclera: Conjunctivae normal.   Cardiovascular:      Rate and Rhythm: Normal rate and regular rhythm.      Heart sounds: Normal heart sounds.   Pulmonary:      Effort: Pulmonary effort is normal. No respiratory distress.      " Breath sounds: Normal breath sounds. No wheezing.   Musculoskeletal:         General: No deformity.      Cervical back: Neck supple.   Skin:     General: Skin is warm and dry.      Comments: Dry, flaky skin on face and bilateral ears   Neurological:      General: No focal deficit present.      Mental Status: He is alert and oriented to person, place, and time.   Psychiatric:         Behavior: Behavior normal.        Result Review :                 Assessment and Plan    Diagnoses and all orders for this visit:    1. Gastritis, presence of bleeding unspecified, unspecified chronicity, unspecified gastritis type (Primary)  Comments:  Increase omeprazole to 40 mg daily.  Consider consultation with gastroenterology.  Orders:  -     CBC & Differential  -     Comprehensive Metabolic Panel  -     Vitamin D 25 Hydroxy  -     Sedimentation Rate  -     C-reactive Protein  -     JOSSELIN by IFA, Reflex 9-biomarkers profile  -     RHEUMATOID FACTOR  -     omeprazole (priLOSEC) 40 MG capsule; Take 1 capsule by mouth Daily. Take in morning, empty stomach  Dispense: 90 capsule; Refill: 1    2. Psoriasis  Comments:  Referred again to dermatology  Orders:  -     CBC & Differential  -     Comprehensive Metabolic Panel  -     Vitamin D 25 Hydroxy  -     Sedimentation Rate  -     C-reactive Protein  -     JOSSELIN by IFA, Reflex 9-biomarkers profile  -     RHEUMATOID FACTOR  -     Ambulatory Referral to Dermatology    3. Prediabetes  Comments:  Fasting labs updated today.  Orders:  -     CBC & Differential  -     Comprehensive Metabolic Panel  -     Hemoglobin A1c  -     Vitamin D 25 Hydroxy  -     Sedimentation Rate  -     C-reactive Protein  -     JOSSELIN by IFA, Reflex 9-biomarkers profile  -     RHEUMATOID FACTOR    4. Hypothyroidism (acquired)  -     CBC & Differential  -     Comprehensive Metabolic Panel  -     TSH+Free T4  -     Vitamin D 25 Hydroxy  -     Sedimentation Rate  -     C-reactive Protein  -     JOSSELIN by IFA, Reflex 9-biomarkers  profile  -     RHEUMATOID FACTOR    5. Multiple joint pain  Comments:  Evaluate with labs today  Orders:  -     CBC & Differential  -     Comprehensive Metabolic Panel  -     Vitamin D 25 Hydroxy  -     Sedimentation Rate  -     C-reactive Protein  -     JOSSELIN by IFA, Reflex 9-biomarkers profile  -     RHEUMATOID FACTOR    6. Mixed hyperlipidemia  -     Lipid Panel With LDL/HDL Ratio    7. Constipation, unspecified constipation type  Comments:  KUB to evaluate.  His mother is currently treating with Linzess, and takes 2-3 times a week.  Orders:  -     XR Abdomen KUB    8. Need for hepatitis C screening test  -     Hepatitis C Antibody        Follow Up   Return in about 6 months (around 1/23/2022) for Recheck.  Patient was given instructions and counseling regarding his condition or for health maintenance advice. Please see specific information pulled into the AVS if appropriate.

## 2021-07-26 LAB
25(OH)D3+25(OH)D2 SERPL-MCNC: 13.3 NG/ML (ref 30–100)
ALBUMIN SERPL-MCNC: 5 G/DL (ref 4.1–5.2)
ALBUMIN/GLOB SERPL: 1.8 {RATIO} (ref 1.2–2.2)
ALP SERPL-CCNC: 66 IU/L (ref 48–121)
ALT SERPL-CCNC: 58 IU/L (ref 0–44)
ANA TITR SER IF: NEGATIVE {TITER}
AST SERPL-CCNC: 33 IU/L (ref 0–40)
BASOPHILS # BLD AUTO: 0 X10E3/UL (ref 0–0.2)
BASOPHILS NFR BLD AUTO: 0 %
BILIRUB SERPL-MCNC: <0.2 MG/DL (ref 0–1.2)
BUN SERPL-MCNC: 13 MG/DL (ref 6–20)
BUN/CREAT SERPL: 12 (ref 9–20)
CALCIUM SERPL-MCNC: 9.7 MG/DL (ref 8.7–10.2)
CHLORIDE SERPL-SCNC: 102 MMOL/L (ref 96–106)
CHOLEST SERPL-MCNC: 234 MG/DL (ref 100–199)
CO2 SERPL-SCNC: 21 MMOL/L (ref 20–29)
CREAT SERPL-MCNC: 1.06 MG/DL (ref 0.76–1.27)
CRP SERPL-MCNC: 5 MG/L (ref 0–10)
EOSINOPHIL # BLD AUTO: 0.1 X10E3/UL (ref 0–0.4)
EOSINOPHIL NFR BLD AUTO: 1 %
ERYTHROCYTE [DISTWIDTH] IN BLOOD BY AUTOMATED COUNT: 13.5 % (ref 11.6–15.4)
ERYTHROCYTE [SEDIMENTATION RATE] IN BLOOD BY WESTERGREN METHOD: 4 MM/HR (ref 0–15)
GLOBULIN SER CALC-MCNC: 2.8 G/DL (ref 1.5–4.5)
GLUCOSE SERPL-MCNC: 92 MG/DL (ref 65–99)
HBA1C MFR BLD: 5.6 % (ref 4.8–5.6)
HCT VFR BLD AUTO: 48.4 % (ref 37.5–51)
HCV AB S/CO SERPL IA: <0.1 S/CO RATIO (ref 0–0.9)
HDLC SERPL-MCNC: 28 MG/DL
HGB BLD-MCNC: 16.1 G/DL (ref 13–17.7)
IMM GRANULOCYTES # BLD AUTO: 0 X10E3/UL (ref 0–0.1)
IMM GRANULOCYTES NFR BLD AUTO: 0 %
LABORATORY COMMENT REPORT: NORMAL
LDLC SERPL CALC-MCNC: 132 MG/DL (ref 0–99)
LDLC/HDLC SERPL: 4.7 RATIO (ref 0–3.6)
LYMPHOCYTES # BLD AUTO: 3.1 X10E3/UL (ref 0.7–3.1)
LYMPHOCYTES NFR BLD AUTO: 34 %
MCH RBC QN AUTO: 27.7 PG (ref 26.6–33)
MCHC RBC AUTO-ENTMCNC: 33.3 G/DL (ref 31.5–35.7)
MCV RBC AUTO: 83 FL (ref 79–97)
MONOCYTES # BLD AUTO: 0.6 X10E3/UL (ref 0.1–0.9)
MONOCYTES NFR BLD AUTO: 7 %
NEUTROPHILS # BLD AUTO: 5.3 X10E3/UL (ref 1.4–7)
NEUTROPHILS NFR BLD AUTO: 58 %
PLATELET # BLD AUTO: 340 X10E3/UL (ref 150–450)
POTASSIUM SERPL-SCNC: 4.8 MMOL/L (ref 3.5–5.2)
PROT SERPL-MCNC: 7.8 G/DL (ref 6–8.5)
RBC # BLD AUTO: 5.81 X10E6/UL (ref 4.14–5.8)
RHEUMATOID FACT SERPL-ACNC: <10 IU/ML (ref 0–13.9)
SODIUM SERPL-SCNC: 139 MMOL/L (ref 134–144)
T4 FREE SERPL-MCNC: 1.04 NG/DL (ref 0.82–1.77)
TRIGL SERPL-MCNC: 409 MG/DL (ref 0–149)
TSH SERPL DL<=0.005 MIU/L-ACNC: 1.14 UIU/ML (ref 0.45–4.5)
VLDLC SERPL CALC-MCNC: 74 MG/DL (ref 5–40)
WBC # BLD AUTO: 9.1 X10E3/UL (ref 3.4–10.8)

## 2021-07-27 DIAGNOSIS — E55.9 VITAMIN D DEFICIENCY: Primary | ICD-10-CM

## 2021-07-28 RX ORDER — ERGOCALCIFEROL 1.25 MG/1
50000 CAPSULE ORAL WEEKLY
Qty: 4 CAPSULE | Refills: 2 | Status: SHIPPED | OUTPATIENT
Start: 2021-07-28 | End: 2021-11-05 | Stop reason: SDUPTHER

## 2021-08-26 ENCOUNTER — OFFICE VISIT (OUTPATIENT)
Dept: FAMILY MEDICINE CLINIC | Facility: CLINIC | Age: 21
End: 2021-08-26

## 2021-08-26 VITALS
SYSTOLIC BLOOD PRESSURE: 120 MMHG | RESPIRATION RATE: 24 BRPM | TEMPERATURE: 98.6 F | HEIGHT: 71 IN | OXYGEN SATURATION: 98 % | HEART RATE: 98 BPM | BODY MASS INDEX: 36.92 KG/M2 | DIASTOLIC BLOOD PRESSURE: 80 MMHG

## 2021-08-26 DIAGNOSIS — R11.2 NAUSEA AND VOMITING, INTRACTABILITY OF VOMITING NOT SPECIFIED, UNSPECIFIED VOMITING TYPE: ICD-10-CM

## 2021-08-26 DIAGNOSIS — R50.9 FEVER, UNSPECIFIED FEVER CAUSE: ICD-10-CM

## 2021-08-26 DIAGNOSIS — R51.9 ACUTE NONINTRACTABLE HEADACHE, UNSPECIFIED HEADACHE TYPE: ICD-10-CM

## 2021-08-26 DIAGNOSIS — R50.9 FEVER, UNSPECIFIED FEVER CAUSE: Primary | ICD-10-CM

## 2021-08-26 PROBLEM — J02.9 SORE THROAT: Status: RESOLVED | Noted: 2021-04-01 | Resolved: 2021-08-26

## 2021-08-26 LAB
EXPIRATION DATE: NORMAL
EXPIRATION DATE: NORMAL
FLUAV AG NPH QL: NEGATIVE
FLUBV AG NPH QL: NEGATIVE
INTERNAL CONTROL: NORMAL
INTERNAL CONTROL: NORMAL
Lab: NORMAL
Lab: NORMAL
S PYO AG THROAT QL: NEGATIVE

## 2021-08-26 PROCEDURE — 99213 OFFICE O/P EST LOW 20 MIN: CPT | Performed by: PHYSICIAN ASSISTANT

## 2021-08-26 PROCEDURE — 87880 STREP A ASSAY W/OPTIC: CPT | Performed by: PHYSICIAN ASSISTANT

## 2021-08-26 PROCEDURE — 87804 INFLUENZA ASSAY W/OPTIC: CPT | Performed by: PHYSICIAN ASSISTANT

## 2021-08-26 RX ORDER — ONDANSETRON 4 MG/1
4 TABLET, ORALLY DISINTEGRATING ORAL EVERY 8 HOURS PRN
Qty: 12 TABLET | Refills: 0 | Status: SHIPPED | OUTPATIENT
Start: 2021-08-26 | End: 2021-11-11

## 2021-08-28 LAB
LABCORP SARS-COV-2, NAA 2 DAY TAT: NORMAL
SARS-COV-2 RNA RESP QL NAA+PROBE: DETECTED

## 2021-09-29 ENCOUNTER — OFFICE VISIT (OUTPATIENT)
Dept: FAMILY MEDICINE CLINIC | Facility: CLINIC | Age: 21
End: 2021-09-29

## 2021-09-29 VITALS
HEART RATE: 93 BPM | OXYGEN SATURATION: 99 % | DIASTOLIC BLOOD PRESSURE: 79 MMHG | HEIGHT: 67 IN | BODY MASS INDEX: 40.49 KG/M2 | TEMPERATURE: 97.5 F | SYSTOLIC BLOOD PRESSURE: 106 MMHG | WEIGHT: 258 LBS

## 2021-09-29 DIAGNOSIS — J01.90 ACUTE NON-RECURRENT SINUSITIS, UNSPECIFIED LOCATION: Primary | ICD-10-CM

## 2021-09-29 PROCEDURE — 99213 OFFICE O/P EST LOW 20 MIN: CPT | Performed by: FAMILY MEDICINE

## 2021-09-29 RX ORDER — AZITHROMYCIN 250 MG/1
TABLET, FILM COATED ORAL
Qty: 6 TABLET | Refills: 0 | Status: SHIPPED | OUTPATIENT
Start: 2021-09-29 | End: 2022-04-22

## 2021-09-29 NOTE — PATIENT INSTRUCTIONS
Go to the nearest ER or return to clinic if symptoms worsen, fever/chill develop    Sinusitis, Adult  Sinusitis is soreness and swelling (inflammation) of your sinuses. Sinuses are hollow spaces in the bones around your face. They are located:  · Around your eyes.  · In the middle of your forehead.  · Behind your nose.  · In your cheekbones.  Your sinuses and nasal passages are lined with a fluid called mucus. Mucus drains out of your sinuses. Swelling can trap mucus in your sinuses. This lets germs (bacteria, virus, or fungus) grow, which leads to infection. Most of the time, this condition is caused by a virus.  What are the causes?  This condition is caused by:  · Allergies.  · Asthma.  · Germs.  · Things that block your nose or sinuses.  · Growths in the nose (nasal polyps).  · Chemicals or irritants in the air.  · Fungus (rare).  What increases the risk?  You are more likely to develop this condition if:  · You have a weak body defense system (immune system).  · You do a lot of swimming or diving.  · You use nasal sprays too much.  · You smoke.  What are the signs or symptoms?  The main symptoms of this condition are pain and a feeling of pressure around the sinuses. Other symptoms include:  · Stuffy nose (congestion).  · Runny nose (drainage).  · Swelling and warmth in the sinuses.  · Headache.  · Toothache.  · A cough that may get worse at night.  · Mucus that collects in the throat or the back of the nose (postnasal drip).  · Being unable to smell and taste.  · Being very tired (fatigue).  · A fever.  · Sore throat.  · Bad breath.  How is this diagnosed?  This condition is diagnosed based on:  · Your symptoms.  · Your medical history.  · A physical exam.  · Tests to find out if your condition is short-term (acute) or long-term (chronic). Your doctor may:  ? Check your nose for growths (polyps).  ? Check your sinuses using a tool that has a light (endoscope).  ? Check for allergies or germs.  ? Do imaging  tests, such as an MRI or CT scan.  How is this treated?  Treatment for this condition depends on the cause and whether it is short-term or long-term.  · If caused by a virus, your symptoms should go away on their own within 10 days. You may be given medicines to relieve symptoms. They include:  ? Medicines that shrink swollen tissue in the nose.  ? Medicines that treat allergies (antihistamines).  ? A spray that treats swelling of the nostrils.   ? Rinses that help get rid of thick mucus in your nose (nasal saline washes).  · If caused by bacteria, your doctor may wait to see if you will get better without treatment. You may be given antibiotic medicine if you have:  ? A very bad infection.  ? A weak body defense system.  · If caused by growths in the nose, you may need to have surgery.  Follow these instructions at home:  Medicines  · Take, use, or apply over-the-counter and prescription medicines only as told by your doctor. These may include nasal sprays.  · If you were prescribed an antibiotic medicine, take it as told by your doctor. Do not stop taking the antibiotic even if you start to feel better.  Hydrate and humidify    · Drink enough water to keep your pee (urine) pale yellow.  · Use a cool mist humidifier to keep the humidity level in your home above 50%.  · Breathe in steam for 10-15 minutes, 3-4 times a day, or as told by your doctor. You can do this in the bathroom while a hot shower is running.  · Try not to spend time in cool or dry air.    Rest  · Rest as much as you can.  · Sleep with your head raised (elevated).  · Make sure you get enough sleep each night.  General instructions    · Put a warm, moist washcloth on your face 3-4 times a day, or as often as told by your doctor. This will help with discomfort.  · Wash your hands often with soap and water. If there is no soap and water, use hand .  · Do not smoke. Avoid being around people who are smoking (secondhand smoke).  · Keep all  follow-up visits as told by your doctor. This is important.    Contact a doctor if:  · You have a fever.  · Your symptoms get worse.  · Your symptoms do not get better within 10 days.  Get help right away if:  · You have a very bad headache.  · You cannot stop throwing up (vomiting).  · You have very bad pain or swelling around your face or eyes.  · You have trouble seeing.  · You feel confused.  · Your neck is stiff.  · You have trouble breathing.  Summary  · Sinusitis is swelling of your sinuses. Sinuses are hollow spaces in the bones around your face.  · This condition is caused by tissues in your nose that become inflamed or swollen. This traps germs. These can lead to infection.  · If you were prescribed an antibiotic medicine, take it as told by your doctor. Do not stop taking it even if you start to feel better.  · Keep all follow-up visits as told by your doctor. This is important.  This information is not intended to replace advice given to you by your health care provider. Make sure you discuss any questions you have with your health care provider.  Document Revised: 05/20/2019 Document Reviewed: 05/20/2019  Elsevier Patient Education © 2021 [employer]

## 2021-09-29 NOTE — PROGRESS NOTES
"Chief Complaint  Ear Fullness (1 week), Earache, and Sore Throat (hurts to swallow)    Subjective          Lane Patton presents to [facility]  Earache   There is pain in both ears. This is a new problem. The current episode started in the past 7 days. There has been no fever. Associated symptoms include coughing and a sore throat. Pertinent negatives include no ear discharge or headaches. He has tried nothing for the symptoms. The treatment provided no relief.   Sore Throat   This is a new problem. The current episode started in the past 7 days. There has been no fever. Associated symptoms include congestion, coughing, ear pain and a plugged ear sensation. Pertinent negatives include no ear discharge, headaches or hoarse voice. He has had no exposure to strep. He has tried nothing for the symptoms. The treatment provided no relief.         The following portions of the patient's history were reviewed and updated as appropriate: allergies, current medications, past family history, past medical history, past social history, past surgical history and problem list.    Objective   Vital Signs:   /79   Pulse 93   Temp 97.5 °F (36.4 °C)   Ht 170.2 cm (67\")   Wt 117 kg (258 lb)   SpO2 99%   BMI 40.41 kg/m²     Physical Exam  Vitals and nursing note reviewed.   Constitutional:       Appearance: He is well-developed.   HENT:      Head: Normocephalic and atraumatic.      Right Ear: External ear normal. There is impacted cerumen.      Left Ear: External ear normal. Tympanic membrane is not injected or perforated.      Nose: Nose normal.      Mouth/Throat:      Pharynx: Oropharyngeal exudate present. No posterior oropharyngeal erythema.      Tonsils: No tonsillar exudate.   Eyes:      Conjunctiva/sclera: Conjunctivae normal.   Cardiovascular:      Rate and Rhythm: Normal rate and regular rhythm.      Heart sounds: Normal heart sounds.   Pulmonary:      Effort: Pulmonary effort is " normal.      Breath sounds: Normal breath sounds. No wheezing.   Skin:     General: Skin is warm.   Neurological:      General: No focal deficit present.      Mental Status: He is alert and oriented to person, place, and time.        Result Review :                 Assessment and Plan    Diagnoses and all orders for this visit:    1. Acute non-recurrent sinusitis, unspecified location (Primary)  -     azithromycin (Zithromax Z-Shaun) 250 MG tablet; Take 2 tablets by mouth on day 1, then 1 tablet daily on days 2-5  Dispense: 6 tablet; Refill: 0    ZPak to improve sinusitis  Encourage warm salt water gargles to soothe sore throat, likely from PND      Follow Up   Return if symptoms worsen or fail to improve.  Patient was given instructions and counseling regarding his condition or for health maintenance advice. Please see specific information pulled into the AVS if appropriate.

## 2021-10-26 ENCOUNTER — TELEPHONE (OUTPATIENT)
Dept: FAMILY MEDICINE CLINIC | Facility: CLINIC | Age: 21
End: 2021-10-26

## 2021-10-26 DIAGNOSIS — L40.9 PSORIASIS: Primary | ICD-10-CM

## 2021-10-26 NOTE — TELEPHONE ENCOUNTER
PATIENT IS REQUESTING A NEW REFERRAL FOR DR. AINBAL IRAHETA MD DERMATOLOGY.  PATIENT HAD MISSED PREVIOUS APPOINTMENT AND WAS TOLD HE NEEDS A NEW REFERRAL.    CALL BACK NUMBER [phone]

## 2021-11-04 DIAGNOSIS — E55.9 VITAMIN D DEFICIENCY: ICD-10-CM

## 2021-11-05 DIAGNOSIS — E55.9 VITAMIN D DEFICIENCY: ICD-10-CM

## 2021-11-05 RX ORDER — ERGOCALCIFEROL 1.25 MG/1
50000 CAPSULE ORAL WEEKLY
Qty: 4 CAPSULE | Refills: 2 | Status: SHIPPED | OUTPATIENT
Start: 2021-11-05 | End: 2023-02-23 | Stop reason: SDUPTHER

## 2021-11-08 RX ORDER — ERGOCALCIFEROL 1.25 MG/1
CAPSULE ORAL
Qty: 4 CAPSULE | Refills: 2 | OUTPATIENT
Start: 2021-11-08

## 2021-11-11 DIAGNOSIS — R11.2 NAUSEA AND VOMITING, INTRACTABILITY OF VOMITING NOT SPECIFIED, UNSPECIFIED VOMITING TYPE: ICD-10-CM

## 2021-11-11 DIAGNOSIS — E03.9 HYPOTHYROIDISM (ACQUIRED): ICD-10-CM

## 2021-11-11 DIAGNOSIS — K29.70 GASTRITIS, PRESENCE OF BLEEDING UNSPECIFIED, UNSPECIFIED CHRONICITY, UNSPECIFIED GASTRITIS TYPE: ICD-10-CM

## 2021-11-11 DIAGNOSIS — J01.90 ACUTE NON-RECURRENT SINUSITIS, UNSPECIFIED LOCATION: ICD-10-CM

## 2021-11-11 RX ORDER — ONDANSETRON 4 MG/1
TABLET, ORALLY DISINTEGRATING ORAL
Qty: 12 TABLET | Refills: 0 | Status: SHIPPED | OUTPATIENT
Start: 2021-11-11

## 2021-11-12 RX ORDER — LEVOTHYROXINE SODIUM 88 UG/1
TABLET ORAL
Qty: 90 TABLET | Refills: 1 | Status: SHIPPED | OUTPATIENT
Start: 2021-11-12 | End: 2022-08-26

## 2021-11-12 RX ORDER — AZITHROMYCIN 250 MG/1
TABLET, FILM COATED ORAL
Qty: 6 TABLET | Refills: 0 | OUTPATIENT
Start: 2021-11-12

## 2021-11-12 RX ORDER — OMEPRAZOLE 20 MG/1
CAPSULE, DELAYED RELEASE ORAL
Qty: 90 CAPSULE | Refills: 2 | OUTPATIENT
Start: 2021-11-12

## 2021-12-10 DIAGNOSIS — K29.70 GASTRITIS, PRESENCE OF BLEEDING UNSPECIFIED, UNSPECIFIED CHRONICITY, UNSPECIFIED GASTRITIS TYPE: ICD-10-CM

## 2021-12-10 DIAGNOSIS — J30.89 ALLERGIC RHINITIS DUE TO OTHER ALLERGIC TRIGGER, UNSPECIFIED SEASONALITY: ICD-10-CM

## 2021-12-10 RX ORDER — OMEPRAZOLE 20 MG/1
CAPSULE, DELAYED RELEASE ORAL
Qty: 90 CAPSULE | Refills: 2 | OUTPATIENT
Start: 2021-12-10

## 2021-12-10 RX ORDER — CETIRIZINE HYDROCHLORIDE 10 MG/1
TABLET ORAL
Qty: 90 TABLET | Refills: 2 | Status: SHIPPED | OUTPATIENT
Start: 2021-12-10 | End: 2022-11-01

## 2022-03-24 ENCOUNTER — TELEPHONE (OUTPATIENT)
Dept: FAMILY MEDICINE CLINIC | Facility: CLINIC | Age: 22
End: 2022-03-24

## 2022-03-24 NOTE — TELEPHONE ENCOUNTER
PHONE CALL FROM MOTHER FOR APPOINTMENT FOR EAR PAIN.  NOTHING AVAILABLE UNTIL 4/14/22.  RECOMMENDED URGENT CARE.

## 2022-04-18 ENCOUNTER — TELEPHONE (OUTPATIENT)
Dept: FAMILY MEDICINE CLINIC | Facility: CLINIC | Age: 22
End: 2022-04-18

## 2022-04-18 NOTE — TELEPHONE ENCOUNTER
Caller: Earnest Patton    Relationship to patient: Mother    Best call back number: [phone]    Chief complaint: CLEARANCE FOR SURGERY     Type of visit: PRE-OP    Requested date: AS SOON AS POSSIBLE BEFORE 05/02/2022    Additional notes:  PATIENT'S (MOTHER) EARNEST STATED THAT PATIENT HAS SURGERY FOR HIS TEETH SCHEDULED 05/02/2022 AND PATIENT NEEDS A APPOINTMENT WITH PCP FOR CLEARANCE FOR SURGERY     OFELIA BARON DO FIRST AVAILABLE FOR PRE-OP IS 06/02/2022 AND PATIENT NEEDS A SOONER APPOINTMENT

## 2022-04-22 ENCOUNTER — TELEPHONE (OUTPATIENT)
Dept: FAMILY MEDICINE CLINIC | Facility: CLINIC | Age: 22
End: 2022-04-22

## 2022-04-22 ENCOUNTER — OFFICE VISIT (OUTPATIENT)
Dept: FAMILY MEDICINE CLINIC | Facility: CLINIC | Age: 22
End: 2022-04-22

## 2022-04-22 VITALS
HEART RATE: 88 BPM | DIASTOLIC BLOOD PRESSURE: 70 MMHG | RESPIRATION RATE: 20 BRPM | WEIGHT: 270.6 LBS | TEMPERATURE: 97.1 F | BODY MASS INDEX: 42.47 KG/M2 | SYSTOLIC BLOOD PRESSURE: 110 MMHG | HEIGHT: 67 IN

## 2022-04-22 DIAGNOSIS — F84.0 AUTISM: ICD-10-CM

## 2022-04-22 DIAGNOSIS — F25.1 SCHIZOAFFECTIVE DISORDER, DEPRESSIVE TYPE: ICD-10-CM

## 2022-04-22 DIAGNOSIS — K02.9 DENTAL CARIES: Primary | ICD-10-CM

## 2022-04-22 PROBLEM — R50.9 FEVER: Status: RESOLVED | Noted: 2021-04-01 | Resolved: 2022-04-22

## 2022-04-22 PROBLEM — B09 VIRAL EXANTHEM: Status: RESOLVED | Noted: 2021-04-01 | Resolved: 2022-04-22

## 2022-04-22 PROBLEM — J06.9 INFECTION OF THE UPPER RESPIRATORY TRACT: Status: RESOLVED | Noted: 2021-04-01 | Resolved: 2022-04-22

## 2022-04-22 PROCEDURE — 99213 OFFICE O/P EST LOW 20 MIN: CPT | Performed by: FAMILY MEDICINE

## 2022-04-22 RX ORDER — METHYLPHENIDATE HYDROCHLORIDE 27 MG/1
27 TABLET ORAL EVERY MORNING
COMMUNITY

## 2022-04-22 NOTE — PROGRESS NOTES
"Chief Complaint   Patient presents with   • Pre Op Clearance / dental work     [facility] on 5-7-22        Subjective      Lane Patton is a 21 y.o. who presents for preprocedure assessment prior to dental cleaning and dental restoration scheduled for May 7. He has tolerated sedation before with previous surgeries. Mom believes plan is for general sedation.     Objective   Vital Signs:  /70   Pulse 88   Temp 97.1 °F (36.2 °C)   Resp 20   Ht 170.2 cm (67.01\")   Wt 123 kg (270 lb 9.6 oz)   BMI 42.37 kg/m²              Physical Exam  Constitutional:       Appearance: He is obese.   Cardiovascular:      Rate and Rhythm: Normal rate and regular rhythm.   Pulmonary:      Effort: Pulmonary effort is normal.      Breath sounds: Normal breath sounds.   Neurological:      Mental Status: He is alert.          Result Review                     Assessment and Plan  Diagnoses and all orders for this visit:    1. Dental caries (Primary)  Comments:  Patient may proceed with dental restoration under sedation.    2. Schizoaffective disorder, depressive type (HCC)    3. Autism            Follow Up  Return if symptoms worsen or fail to improve.  Patient was given instructions and counseling regarding his condition or for health maintenance advice. Please see specific information pulled into the AVS if appropriate.       "

## 2022-04-29 DIAGNOSIS — R63.2 BINGE EATING: ICD-10-CM

## 2022-04-29 RX ORDER — TOPIRAMATE 50 MG/1
50 TABLET, FILM COATED ORAL NIGHTLY
Qty: 90 TABLET | Refills: 0 | Status: SHIPPED | OUTPATIENT
Start: 2022-04-29 | End: 2022-08-09

## 2022-06-02 DIAGNOSIS — J30.1 NON-SEASONAL ALLERGIC RHINITIS DUE TO POLLEN: ICD-10-CM

## 2022-06-03 RX ORDER — MONTELUKAST SODIUM 10 MG/1
TABLET ORAL
Qty: 90 TABLET | Refills: 3 | Status: SHIPPED | OUTPATIENT
Start: 2022-06-03

## 2022-06-13 ENCOUNTER — OFFICE VISIT (OUTPATIENT)
Dept: FAMILY MEDICINE CLINIC | Facility: CLINIC | Age: 22
End: 2022-06-13

## 2022-06-13 VITALS
HEART RATE: 94 BPM | RESPIRATION RATE: 24 BRPM | BODY MASS INDEX: 41.09 KG/M2 | DIASTOLIC BLOOD PRESSURE: 78 MMHG | OXYGEN SATURATION: 98 % | WEIGHT: 261.8 LBS | SYSTOLIC BLOOD PRESSURE: 118 MMHG | TEMPERATURE: 98.2 F | HEIGHT: 67 IN

## 2022-06-13 DIAGNOSIS — H69.81 ACUTE DYSFUNCTION OF RIGHT EUSTACHIAN TUBE: ICD-10-CM

## 2022-06-13 DIAGNOSIS — J02.9 ACUTE PHARYNGITIS, UNSPECIFIED ETIOLOGY: Primary | ICD-10-CM

## 2022-06-13 DIAGNOSIS — R04.0 EPISTAXIS: ICD-10-CM

## 2022-06-13 PROCEDURE — 99213 OFFICE O/P EST LOW 20 MIN: CPT | Performed by: FAMILY MEDICINE

## 2022-06-13 RX ORDER — CEPHALEXIN 500 MG/1
1000 CAPSULE ORAL 2 TIMES DAILY
Qty: 28 CAPSULE | Refills: 0 | Status: SHIPPED | OUTPATIENT
Start: 2022-06-13 | End: 2022-08-05

## 2022-06-13 NOTE — PROGRESS NOTES
"Chief Complaint   Patient presents with   • Right ear pain      Past week    • Nose Bleed     Most recent episode was yesterday        Subjective      Lane Patton is a 21 y.o. who presents for 2 to 3 days of right ear pain, sore throat, and recurrent epistaxis.  He is also reported that allergy symptoms are out of control with increased ocular symptoms.    Objective   Vital Signs:  /78   Pulse 94   Temp 98.2 °F (36.8 °C)   Resp 24   Ht 170.2 cm (67.01\")   Wt 119 kg (261 lb 12.8 oz)   SpO2 98%   BMI 40.99 kg/m²     Class 3 Severe Obesity (BMI >=40). Obesity-related health conditions include the following: none. Obesity is unchanged. BMI is is above average; BMI management plan is completed. We discussed portion control and increasing exercise.        Physical Exam  Constitutional:       Appearance: Normal appearance.   HENT:      Head: Normocephalic and atraumatic.      Right Ear: Tympanic membrane and ear canal normal.      Left Ear: Tympanic membrane and ear canal normal.      Nose: Mucosal edema, congestion and rhinorrhea present.      Left Nostril: Epistaxis present.      Right Turbinates: Swollen.      Left Turbinates: Swollen.      Right Sinus: No maxillary sinus tenderness or frontal sinus tenderness.      Left Sinus: No maxillary sinus tenderness or frontal sinus tenderness.      Mouth/Throat:      Mouth: Mucous membranes are moist.      Pharynx: Oropharynx is clear. Posterior oropharyngeal erythema present.   Eyes:      Conjunctiva/sclera: Conjunctivae normal.   Cardiovascular:      Rate and Rhythm: Normal rate and regular rhythm.      Heart sounds: Normal heart sounds. No murmur heard.  Pulmonary:      Effort: Pulmonary effort is normal. No respiratory distress.      Breath sounds: Normal breath sounds.   Abdominal:      General: Abdomen is flat. Bowel sounds are normal. There is no distension.      Palpations: Abdomen is soft.      Tenderness: There is no abdominal tenderness. "   Musculoskeletal:      Cervical back: Normal range of motion and neck supple. No tenderness.   Lymphadenopathy:      Cervical: No cervical adenopathy.   Skin:     General: Skin is warm and dry.   Neurological:      Mental Status: He is alert.   Psychiatric:         Mood and Affect: Mood normal.          Result Review                     Assessment and Plan  Diagnoses and all orders for this visit:    1. Acute pharyngitis, unspecified etiology (Primary)  Comments:  Cephalexin 1000 twice daily  Orders:  -     cephalexin (Keflex) 500 MG capsule; Take 2 capsules by mouth 2 (Two) Times a Day.  Dispense: 28 capsule; Refill: 0    2. Epistaxis  Comments:  Reviewed how to stop a nosebleed.  Avoid nose picking    3. Acute dysfunction of right eustachian tube  Comments:  Tylenol for pain.  Antibiotic should help.  Continue OTC antihistamines            Follow Up  No follow-ups on file.  Patient was given instructions and counseling regarding his condition or for health maintenance advice. Please see specific information pulled into the AVS if appropriate.

## 2022-07-07 DIAGNOSIS — K29.70 GASTRITIS, PRESENCE OF BLEEDING UNSPECIFIED, UNSPECIFIED CHRONICITY, UNSPECIFIED GASTRITIS TYPE: ICD-10-CM

## 2022-07-07 RX ORDER — OMEPRAZOLE 40 MG/1
CAPSULE, DELAYED RELEASE ORAL
Qty: 90 CAPSULE | Refills: 1 | Status: SHIPPED | OUTPATIENT
Start: 2022-07-07 | End: 2023-03-08

## 2022-08-05 ENCOUNTER — OFFICE VISIT (OUTPATIENT)
Dept: FAMILY MEDICINE CLINIC | Facility: CLINIC | Age: 22
End: 2022-08-05

## 2022-08-05 VITALS
RESPIRATION RATE: 18 BRPM | SYSTOLIC BLOOD PRESSURE: 98 MMHG | WEIGHT: 262.2 LBS | DIASTOLIC BLOOD PRESSURE: 62 MMHG | BODY MASS INDEX: 41.15 KG/M2 | HEART RATE: 85 BPM | OXYGEN SATURATION: 98 % | HEIGHT: 67 IN | TEMPERATURE: 98.4 F

## 2022-08-05 DIAGNOSIS — R10.13 DYSPEPSIA: Primary | ICD-10-CM

## 2022-08-05 PROCEDURE — 99213 OFFICE O/P EST LOW 20 MIN: CPT | Performed by: FAMILY MEDICINE

## 2022-08-05 RX ORDER — FAMOTIDINE 20 MG/1
20 TABLET, FILM COATED ORAL NIGHTLY PRN
Qty: 30 TABLET | Refills: 2 | Status: SHIPPED | OUTPATIENT
Start: 2022-08-05 | End: 2022-11-15

## 2022-08-05 NOTE — PROGRESS NOTES
"Chief Complaint   Patient presents with   • Abdominal Pain     All over       Subjective      Lane Patton is a 21 y.o. who presents for 2 weeks of nightly abdominal pain that is described as as a crampy pain on the right side of the abdomen that seems to be relieved with Tums.  Patient has a history of chronic constipation but reports bowels are moving well.  He denies nausea and vomiting.  He is awake most of the evening and night per habit and that is when he does most of his eating.  He claims he is eating good foods and junk food.    Review of Systems    Objective   Vital Signs:  BP 98/62   Pulse 85   Temp 98.4 °F (36.9 °C)   Resp 18   Ht 170.2 cm (67.01\")   Wt 119 kg (262 lb 3.2 oz)   SpO2 98%   BMI 41.06 kg/m²             Physical Exam  Constitutional:       Appearance: Normal appearance.   Cardiovascular:      Rate and Rhythm: Normal rate.      Pulses: Normal pulses.      Heart sounds: Normal heart sounds.   Pulmonary:      Effort: Pulmonary effort is normal.      Breath sounds: Normal breath sounds.   Abdominal:      General: Abdomen is flat. Bowel sounds are normal.      Palpations: Abdomen is soft.   Neurological:      Mental Status: He is alert.          Result Review                     Assessment and Plan  Diagnoses and all orders for this visit:    1. Dyspepsia (Primary)  Comments:  1.  Continue emphasis on minimally processed foods  2.  Add Pepcid at night.  Continue omeprazole in the morning  Orders:  -     famotidine (Pepcid) 20 MG tablet; Take 1 tablet by mouth At Night As Needed for Heartburn.  Dispense: 30 tablet; Refill: 2    2. Body mass index (BMI) of 40.0 to 44.9 in adult (HCC)            Follow Up  No follow-ups on file.  Patient was given instructions and counseling regarding his condition or for health maintenance advice. Please see specific information pulled into the AVS if appropriate.       "

## 2022-08-09 DIAGNOSIS — R63.2 BINGE EATING: ICD-10-CM

## 2022-08-09 RX ORDER — TOPIRAMATE 50 MG/1
TABLET, FILM COATED ORAL
Qty: 90 TABLET | Refills: 0 | Status: SHIPPED | OUTPATIENT
Start: 2022-08-09 | End: 2022-11-23

## 2022-08-24 DIAGNOSIS — E03.9 HYPOTHYROIDISM (ACQUIRED): ICD-10-CM

## 2022-08-26 RX ORDER — LEVOTHYROXINE SODIUM 88 UG/1
TABLET ORAL
Qty: 30 TABLET | Refills: 0 | Status: SHIPPED | OUTPATIENT
Start: 2022-08-26 | End: 2022-10-07

## 2022-10-06 DIAGNOSIS — E03.9 HYPOTHYROIDISM (ACQUIRED): ICD-10-CM

## 2022-10-07 RX ORDER — LEVOTHYROXINE SODIUM 88 UG/1
TABLET ORAL
Qty: 30 TABLET | Refills: 0 | Status: SHIPPED | OUTPATIENT
Start: 2022-10-07 | End: 2022-11-15

## 2022-10-31 DIAGNOSIS — J30.89 ALLERGIC RHINITIS DUE TO OTHER ALLERGIC TRIGGER, UNSPECIFIED SEASONALITY: ICD-10-CM

## 2022-11-01 RX ORDER — CETIRIZINE HYDROCHLORIDE 10 MG/1
TABLET ORAL
Qty: 90 TABLET | Refills: 2 | Status: SHIPPED | OUTPATIENT
Start: 2022-11-01

## 2022-11-15 ENCOUNTER — OFFICE VISIT (OUTPATIENT)
Dept: FAMILY MEDICINE CLINIC | Facility: CLINIC | Age: 22
End: 2022-11-15

## 2022-11-15 VITALS
DIASTOLIC BLOOD PRESSURE: 62 MMHG | BODY MASS INDEX: 41.75 KG/M2 | OXYGEN SATURATION: 99 % | SYSTOLIC BLOOD PRESSURE: 116 MMHG | HEART RATE: 105 BPM | TEMPERATURE: 100 F | RESPIRATION RATE: 20 BRPM | HEIGHT: 67 IN | WEIGHT: 266 LBS

## 2022-11-15 DIAGNOSIS — R10.13 DYSPEPSIA: ICD-10-CM

## 2022-11-15 DIAGNOSIS — J06.9 ACUTE URI: Primary | ICD-10-CM

## 2022-11-15 DIAGNOSIS — E03.9 HYPOTHYROIDISM (ACQUIRED): ICD-10-CM

## 2022-11-15 LAB
EXPIRATION DATE: NORMAL
FLUAV AG UPPER RESP QL IA.RAPID: NOT DETECTED
FLUBV AG UPPER RESP QL IA.RAPID: NOT DETECTED
INTERNAL CONTROL: NORMAL
Lab: NORMAL
SARS-COV-2 AG UPPER RESP QL IA.RAPID: NOT DETECTED

## 2022-11-15 PROCEDURE — 87428 SARSCOV & INF VIR A&B AG IA: CPT | Performed by: PHYSICIAN ASSISTANT

## 2022-11-15 PROCEDURE — 99213 OFFICE O/P EST LOW 20 MIN: CPT | Performed by: PHYSICIAN ASSISTANT

## 2022-11-15 RX ORDER — AZITHROMYCIN 250 MG/1
TABLET, FILM COATED ORAL
Qty: 6 TABLET | Refills: 0 | Status: SHIPPED | OUTPATIENT
Start: 2022-11-15

## 2022-11-15 RX ORDER — LEVOTHYROXINE SODIUM 88 UG/1
TABLET ORAL
Qty: 30 TABLET | Refills: 0 | Status: SHIPPED | OUTPATIENT
Start: 2022-11-15 | End: 2022-12-23

## 2022-11-15 NOTE — PROGRESS NOTES
"Subjective   Lane Patton is a 22 y.o. male.     History of Present Illness   Pt presents with mother with CC of sinus congestion, drainage, fatigue, sore throat, HA, and cough. Symptoms started last Wednesday. LGF in office    Sister has had some URI symptoms recently as well     The following portions of the patient's history were reviewed and updated as appropriate: allergies, current medications, past family history, past medical history, past social history, past surgical history and problem list.    Review of Systems  As noted in HPI      Objective    Blood pressure 116/62, pulse 105, temperature 100 °F (37.8 °C), resp. rate 20, height 170.2 cm (67\"), weight 121 kg (266 lb), SpO2 99 %.     Physical Exam  Vitals and nursing note reviewed.   Constitutional:       Appearance: Normal appearance.   HENT:      Head: Normocephalic.      Right Ear: There is impacted cerumen.      Left Ear: There is impacted cerumen.      Nose: Congestion present.      Mouth/Throat:      Pharynx: No oropharyngeal exudate or posterior oropharyngeal erythema.   Eyes:      Conjunctiva/sclera: Conjunctivae normal.   Cardiovascular:      Rate and Rhythm: Normal rate and regular rhythm.   Pulmonary:      Effort: Pulmonary effort is normal. No respiratory distress.      Breath sounds: Normal breath sounds. No wheezing or rhonchi.   Skin:     General: Skin is warm and dry.   Neurological:      Mental Status: He is alert and oriented to person, place, and time.   Psychiatric:         Mood and Affect: Mood normal.         Behavior: Behavior normal.         Assessment & Plan   Diagnoses and all orders for this visit:    1. Acute URI (Primary)  -     POCT SARS-CoV-2 Antigen ROBERT + Flu      covid and flu negative. Pt unable to tolerate strep screen, however throat exam normal in office   Treatment as outlined in plan   F/U INB            "

## 2022-11-17 RX ORDER — FAMOTIDINE 20 MG/1
20 TABLET, FILM COATED ORAL NIGHTLY PRN
Qty: 90 TABLET | Refills: 0 | Status: SHIPPED | OUTPATIENT
Start: 2022-11-17

## 2022-11-22 DIAGNOSIS — R63.2 BINGE EATING: ICD-10-CM

## 2022-11-23 RX ORDER — TOPIRAMATE 50 MG/1
TABLET, FILM COATED ORAL
Qty: 90 TABLET | Refills: 0 | Status: SHIPPED | OUTPATIENT
Start: 2022-11-23 | End: 2023-02-20 | Stop reason: SDUPTHER

## 2022-12-23 DIAGNOSIS — E03.9 HYPOTHYROIDISM (ACQUIRED): ICD-10-CM

## 2022-12-23 RX ORDER — LEVOTHYROXINE SODIUM 88 UG/1
TABLET ORAL
Qty: 30 TABLET | Refills: 0 | Status: SHIPPED | OUTPATIENT
Start: 2022-12-23 | End: 2023-02-09

## 2023-02-08 DIAGNOSIS — E03.9 HYPOTHYROIDISM (ACQUIRED): ICD-10-CM

## 2023-02-09 RX ORDER — LEVOTHYROXINE SODIUM 88 UG/1
TABLET ORAL
Qty: 30 TABLET | Refills: 0 | Status: SHIPPED | OUTPATIENT
Start: 2023-02-09 | End: 2023-03-20

## 2023-02-20 ENCOUNTER — OFFICE VISIT (OUTPATIENT)
Dept: FAMILY MEDICINE CLINIC | Facility: CLINIC | Age: 23
End: 2023-02-20
Payer: MEDICAID

## 2023-02-20 VITALS
HEIGHT: 67 IN | OXYGEN SATURATION: 99 % | DIASTOLIC BLOOD PRESSURE: 78 MMHG | HEART RATE: 78 BPM | TEMPERATURE: 97.8 F | WEIGHT: 273.8 LBS | SYSTOLIC BLOOD PRESSURE: 118 MMHG | RESPIRATION RATE: 22 BRPM | BODY MASS INDEX: 42.97 KG/M2

## 2023-02-20 DIAGNOSIS — E04.2 MULTINODULAR THYROID: ICD-10-CM

## 2023-02-20 DIAGNOSIS — R10.9 ABDOMINAL PAIN, UNSPECIFIED ABDOMINAL LOCATION: Primary | ICD-10-CM

## 2023-02-20 DIAGNOSIS — K59.00 CONSTIPATION, UNSPECIFIED CONSTIPATION TYPE: ICD-10-CM

## 2023-02-20 DIAGNOSIS — E55.9 VITAMIN D DEFICIENCY: ICD-10-CM

## 2023-02-20 DIAGNOSIS — E66.01 MORBID (SEVERE) OBESITY DUE TO EXCESS CALORIES: ICD-10-CM

## 2023-02-20 DIAGNOSIS — E78.2 MIXED HYPERLIPIDEMIA: ICD-10-CM

## 2023-02-20 DIAGNOSIS — E03.9 HYPOTHYROIDISM (ACQUIRED): ICD-10-CM

## 2023-02-20 DIAGNOSIS — R51.9 DAILY HEADACHE: ICD-10-CM

## 2023-02-20 PROCEDURE — 99214 OFFICE O/P EST MOD 30 MIN: CPT | Performed by: FAMILY MEDICINE

## 2023-02-20 RX ORDER — TOPIRAMATE 50 MG/1
TABLET, FILM COATED ORAL
Qty: 180 TABLET | Refills: 1 | Status: SHIPPED | OUTPATIENT
Start: 2023-02-20

## 2023-02-20 NOTE — PROGRESS NOTES
Chief Complaint  Headache (X 4-5 months), Abdominal Pain (X 4-5 months), and Rapid Heart Rate (X 4-5 months)    Subjective          Lane Patton presents to [facility]  History of Present Illness  He is here with Earnest pope, grandmother.     Headache  She reports that Lane has been having headaches x 4-5 months   She thinks that some could be related to overuse of his phone, basically on his phone constantly  He wears glasses, but has been some time since he had vision checked.  He is taking topiramate 50 mg nightly.     Abdominal pain  Complains of daily stomach pain  Pain across center of abdomen  Eating doesn't make symptoms worse  Has occasional heartburn, takes omeprazole and TUMS  Denies constipation and diarrhea, takes Linzess if constipation develops   Intermittent vomiting, doesn't occur often  Similar symptoms in 2020, CT abd pelvis completed Feb 2020 with only findings of hepatic steatosis  Also, reviewing his medical history he has been diagnosed with chronic gastritis, irritable bowel syndrome.  He has not seen gastroenterology, but his grandmother would like for him to see.    He also complains of fast heart rate, which is also been going on for 4 to 5 months  He typically drinks coffee daily and Mountain Dew intermittently.  He takes methylphenidate for treatment of ADHD.  This is managed by provider outside of our office.  Thyroid labs have not been completed in some time.  He is taking levothyroxine 88 mcg daily.  He also has history of multinodular thyroid, visualized with ultrasound in 2019.    The following portions of the patient's history were reviewed and updated as appropriate: allergies, current medications, past family history, past medical history, past social history, past surgical history and problem list.    Objective      Physical Exam  Vitals reviewed.   Constitutional:       Appearance: He is well-developed. He is obese.   HENT:      Head:  Normocephalic and atraumatic.   Cardiovascular:      Rate and Rhythm: Normal rate and regular rhythm.      Heart sounds: Normal heart sounds.   Pulmonary:      Effort: Pulmonary effort is normal.      Breath sounds: Normal breath sounds.   Abdominal:      General: Bowel sounds are decreased.      Palpations: Abdomen is soft.      Tenderness: There is no abdominal tenderness. There is no guarding.   Neurological:      Mental Status: He is alert.        Result Review :                Assessment and Plan    Diagnoses and all orders for this visit:    1. Abdominal pain, unspecified abdominal location (Primary)  -     Ambulatory Referral to Gastroenterology    2. Daily headache  -     topiramate (TOPAMAX) 50 MG tablet; Take 1/2 tab po q AM and 1 tab po q hs x 1 week, then increase to 1 tab po BID  Dispense: 180 tablet; Refill: 1    3. Multinodular thyroid  -     US Thyroid  -     CBC & Differential  -     Comprehensive Metabolic Panel  -     Hemoglobin A1c  -     TSH  -     Vitamin D,25-Hydroxy    4. Hypothyroidism (acquired)  -     CBC & Differential  -     Comprehensive Metabolic Panel  -     Hemoglobin A1c  -     TSH  -     Vitamin D,25-Hydroxy    5. Constipation, unspecified constipation type  -     XR Abdomen KUB    6. Vitamin D deficiency  -     CBC & Differential  -     Comprehensive Metabolic Panel  -     Hemoglobin A1c  -     TSH  -     Vitamin D,25-Hydroxy    7. Mixed hyperlipidemia  -     Lipid Panel With / Chol / HDL Ratio    8. Morbid (severe) obesity due to excess calories (HCC)  -     CBC & Differential  -     Comprehensive Metabolic Panel  -     Hemoglobin A1c  -     TSH  -     Vitamin D,25-Hydroxy    9. Body mass index (BMI) of 40.0 to 44.9 in adult (HCC)  -     CBC & Differential  -     Comprehensive Metabolic Panel  -     Hemoglobin A1c  -     TSH  -     Vitamin D,25-Hydroxy    He is fasting today, so we will update routine labs  Increased dose of topiramate to 50 mg twice daily to treat daily  headache.  Encouraged to hydrate more with water, avoid screen time, and update vision exam.  KUB to evaluate abdominal symptoms, along with consulting with GI  Avoid caffeine intake if tachycardia is present.  Earnest states that they will speak to his psychiatrist about this being a side effect of methylphenidate.    Follow Up   No follow-ups on file.  Patient was given instructions and counseling regarding his condition or for health maintenance advice. Please see specific information pulled into the AVS if appropriate.

## 2023-02-21 ENCOUNTER — HOSPITAL ENCOUNTER (OUTPATIENT)
Dept: GENERAL RADIOLOGY | Facility: HOSPITAL | Age: 23
Discharge: HOME | End: 2023-02-21
Admitting: FAMILY MEDICINE
Payer: MEDICAID

## 2023-02-21 LAB
25(OH)D3+25(OH)D2 SERPL-MCNC: 13.7 NG/ML (ref 30–100)
ALBUMIN SERPL-MCNC: 4.9 G/DL (ref 4.1–5.2)
ALBUMIN/GLOB SERPL: 1.8 {RATIO} (ref 1.2–2.2)
ALP SERPL-CCNC: 49 IU/L (ref 44–121)
ALT SERPL-CCNC: 65 IU/L (ref 0–44)
AST SERPL-CCNC: 31 IU/L (ref 0–40)
BASOPHILS # BLD AUTO: 0.1 X10E3/UL (ref 0–0.2)
BASOPHILS NFR BLD AUTO: 1 %
BILIRUB SERPL-MCNC: 0.2 MG/DL (ref 0–1.2)
BUN SERPL-MCNC: 9 MG/DL (ref 6–20)
BUN/CREAT SERPL: 9 (ref 9–20)
CALCIUM SERPL-MCNC: 9.8 MG/DL (ref 8.7–10.2)
CHLORIDE SERPL-SCNC: 99 MMOL/L (ref 96–106)
CHOLEST SERPL-MCNC: 227 MG/DL (ref 100–199)
CHOLEST/HDLC SERPL: 7.6 RATIO (ref 0–5)
CO2 SERPL-SCNC: 20 MMOL/L (ref 20–29)
CREAT SERPL-MCNC: 0.96 MG/DL (ref 0.76–1.27)
EGFRCR SERPLBLD CKD-EPI 2021: 114 ML/MIN/1.73
EOSINOPHIL # BLD AUTO: 0.1 X10E3/UL (ref 0–0.4)
EOSINOPHIL NFR BLD AUTO: 1 %
ERYTHROCYTE [DISTWIDTH] IN BLOOD BY AUTOMATED COUNT: 13.5 % (ref 11.6–15.4)
GLOBULIN SER CALC-MCNC: 2.7 G/DL (ref 1.5–4.5)
GLUCOSE SERPL-MCNC: 114 MG/DL (ref 70–99)
HBA1C MFR BLD: 5.5 % (ref 4.8–5.6)
HCT VFR BLD AUTO: 49.8 % (ref 37.5–51)
HDLC SERPL-MCNC: 30 MG/DL
HGB BLD-MCNC: 16.4 G/DL (ref 13–17.7)
IMM GRANULOCYTES # BLD AUTO: 0 X10E3/UL (ref 0–0.1)
IMM GRANULOCYTES NFR BLD AUTO: 0 %
LDLC SERPL CALC-MCNC: 116 MG/DL (ref 0–99)
LYMPHOCYTES # BLD AUTO: 3.6 X10E3/UL (ref 0.7–3.1)
LYMPHOCYTES NFR BLD AUTO: 38 %
MCH RBC QN AUTO: 27 PG (ref 26.6–33)
MCHC RBC AUTO-ENTMCNC: 32.9 G/DL (ref 31.5–35.7)
MCV RBC AUTO: 82 FL (ref 79–97)
MONOCYTES # BLD AUTO: 0.6 X10E3/UL (ref 0.1–0.9)
MONOCYTES NFR BLD AUTO: 6 %
NEUTROPHILS # BLD AUTO: 5 X10E3/UL (ref 1.4–7)
NEUTROPHILS NFR BLD AUTO: 54 %
PLATELET # BLD AUTO: 320 X10E3/UL (ref 150–450)
POTASSIUM SERPL-SCNC: 4.5 MMOL/L (ref 3.5–5.2)
PROT SERPL-MCNC: 7.6 G/DL (ref 6–8.5)
RBC # BLD AUTO: 6.07 X10E6/UL (ref 4.14–5.8)
SODIUM SERPL-SCNC: 136 MMOL/L (ref 134–144)
TRIGL SERPL-MCNC: 463 MG/DL (ref 0–149)
TSH SERPL DL<=0.005 MIU/L-ACNC: 0.86 UIU/ML (ref 0.45–4.5)
VLDLC SERPL CALC-MCNC: 81 MG/DL (ref 5–40)
WBC # BLD AUTO: 9.4 X10E3/UL (ref 3.4–10.8)

## 2023-02-21 PROCEDURE — 74018 RADEX ABDOMEN 1 VIEW: CPT

## 2023-02-23 DIAGNOSIS — E55.9 VITAMIN D DEFICIENCY: ICD-10-CM

## 2023-02-23 RX ORDER — ERGOCALCIFEROL 1.25 MG/1
50000 CAPSULE ORAL WEEKLY
Qty: 4 CAPSULE | Refills: 5 | Status: SHIPPED | OUTPATIENT
Start: 2023-02-23

## 2023-02-24 RX ORDER — PRAVASTATIN SODIUM 20 MG
20 TABLET ORAL DAILY
Qty: 90 TABLET | Refills: 3 | Status: SHIPPED | OUTPATIENT
Start: 2023-02-24

## 2023-03-08 ENCOUNTER — OFFICE VISIT (OUTPATIENT)
Dept: GASTROENTEROLOGY | Facility: CLINIC | Age: 23
End: 2023-03-08
Payer: MEDICAID

## 2023-03-08 VITALS
BODY MASS INDEX: 42.25 KG/M2 | HEART RATE: 85 BPM | TEMPERATURE: 97.3 F | SYSTOLIC BLOOD PRESSURE: 120 MMHG | HEIGHT: 67 IN | WEIGHT: 269.2 LBS | DIASTOLIC BLOOD PRESSURE: 60 MMHG

## 2023-03-08 DIAGNOSIS — R10.13 EPIGASTRIC PAIN: ICD-10-CM

## 2023-03-08 DIAGNOSIS — R13.19 ESOPHAGEAL DYSPHAGIA: ICD-10-CM

## 2023-03-08 DIAGNOSIS — K58.1 IRRITABLE BOWEL SYNDROME WITH CONSTIPATION: ICD-10-CM

## 2023-03-08 DIAGNOSIS — K21.9 GASTROESOPHAGEAL REFLUX DISEASE, UNSPECIFIED WHETHER ESOPHAGITIS PRESENT: Primary | ICD-10-CM

## 2023-03-08 PROCEDURE — 99204 OFFICE O/P NEW MOD 45 MIN: CPT | Performed by: NURSE PRACTITIONER

## 2023-03-08 PROCEDURE — 1159F MED LIST DOCD IN RCRD: CPT | Performed by: NURSE PRACTITIONER

## 2023-03-08 PROCEDURE — 1160F RVW MEDS BY RX/DR IN RCRD: CPT | Performed by: NURSE PRACTITIONER

## 2023-03-08 RX ORDER — PANTOPRAZOLE SODIUM 40 MG/1
40 TABLET, DELAYED RELEASE ORAL DAILY
Qty: 30 TABLET | Refills: 11 | Status: SHIPPED | OUTPATIENT
Start: 2023-03-08

## 2023-03-08 NOTE — PROGRESS NOTES
GASTROENTEROLOGY OFFICE NOTE  Lane Patton  [id number]  2000    CARE TEAM  Patient Care Team:  Caity Simmons DO as PCP - General (Family Medicine)    Referring Provider: Caity Simmons DO    Chief Complaint   Patient presents with   • Abdominal Pain        HISTORY OF PRESENT ILLNESS:  Patient is a 22-year-old male with medical history that includes ADHD, asthma, autism, bipolar affective order and constipation.  Seen today with his grandmother and caregiver with complaints of epigastric pain, heartburn, dysphagia and right lower quadrant abdominal pain.    He states that he has a burning pain in his chest that occurs almost every day, sometimes he has associated nausea.  He feels as if he is having troubles swallowing solid foods.  He has been taking omeprazole 40 mg daily for several years and says it just does not work.    He further complains of pain in his right lower abdomen.  He has constipation and takes the Linzess 145 mcg every 5 or 6 days, not on a consistent basis.  He typically only has a bowel movement once a week or so.  He does not really know if his right lower quadrant abdominal pain resolves after having a bowel movement.  Recently, he had a KUB with complaints of right lower quadrant abdominal pain and a large amount of stool burden was noted.    PAST MEDICAL HISTORY  Past Medical History:   Diagnosis Date   • Acute sinusitis    • ADHD (attention deficit hyperactivity disorder)    • Asthma    • Autism    • Bipolar affective disorder (HCC)    • Upper respiratory infection    • UTI (urinary tract infection)         PAST SURGICAL HISTORY  Past Surgical History:   Procedure Laterality Date   • ADENOIDECTOMY     • APPENDECTOMY     • CHOLECYSTECTOMY     • TONSILLECTOMY     • TYMPANOSTOMY TUBE PLACEMENT          MEDICATIONS:    Current Outpatient Medications:   •  cetirizine (zyrTEC) 10 MG tablet, TAKE ONE TABLET BY MOUTH DAILY, Disp: 90 tablet, Rfl: 2  •  famotidine (PEPCID) 20  MG tablet, Take 1 tablet by mouth At Night As Needed for Heartburn (for heartburn)., Disp: 90 tablet, Rfl: 0  •  hydrocortisone 0.5 % cream, Apply  topically to the appropriate area as directed 2 (Two) Times a Day., Disp: 28.35 g, Rfl: 0  •  hydrOXYzine (ATARAX) 25 MG tablet, Take 1 tablet by mouth Every 8 (Eight) Hours As Needed for Allergies (congestion)., Disp: 30 tablet, Rfl: 0  •  levothyroxine (SYNTHROID, LEVOTHROID) 88 MCG tablet, TAKE ONE TABLET BY MOUTH EVERY MORNING ON AN EMPTY STOMACH WAIT 1 HOUR BEFORE FOOD OR OTHER MEDICATION, Disp: 30 tablet, Rfl: 0  •  linaclotide (Linzess) 145 MCG capsule capsule, Take 1 capsule by mouth Every Morning Before Breakfast., Disp: 90 capsule, Rfl: 1  •  methylphenidate 27 MG CR tablet, Take 1 tablet by mouth Every Morning, Disp: , Rfl:   •  montelukast (SINGULAIR) 10 MG tablet, TAKE ONE TABLET BY MOUTH ONCE NIGHTLY, Disp: 90 tablet, Rfl: 3  •  OXcarbazepine (TRILEPTAL) 150 MG tablet, , Disp: , Rfl:   •  pravastatin (Pravachol) 20 MG tablet, Take 1 tablet by mouth Daily., Disp: 90 tablet, Rfl: 3  •  prazosin (MINIPRESS) 2 MG capsule, Take 1 capsule by mouth Every Night., Disp: , Rfl:   •  QUEtiapine (SEROquel) 300 MG tablet, Take 1 tablet by mouth Every Night., Disp: , Rfl:   •  SUMAtriptan (IMITREX) 100 MG tablet, Take 1 tab po prn migraine headache or stomach ache. Can repeat at 2hr prn. Max 2 in 24 hr., Disp: 9 tablet, Rfl: 0  •  topiramate (TOPAMAX) 50 MG tablet, Take 1/2 tab po q AM and 1 tab po q hs x 1 week, then increase to 1 tab po BID, Disp: 180 tablet, Rfl: 1  •  vitamin D (ERGOCALCIFEROL) 1.25 MG (58334 UT) capsule capsule, Take 1 capsule by mouth 1 (One) Time Per Week., Disp: 4 capsule, Rfl: 5  •  Zinc Acetate, Oral, (ZINC ACETATE PO), Take  by mouth., Disp: , Rfl:   •  azithromycin (Zithromax Z-Shaun) 250 MG tablet, Take 2 tablets by mouth on day 1, then 1 tablet daily on days 2-5, Disp: 6 tablet, Rfl: 0  •  ondansetron ODT (ZOFRAN-ODT) 4 MG disintegrating  "tablet, DISSOLVE ONE TABLET BY MOUTH EVERY 8 HOURS AS NEEDED FOR NAUSEA AND VOMITING, Disp: 12 tablet, Rfl: 0  •  pantoprazole (PROTONIX) 40 MG EC tablet, Take 1 tablet by mouth Daily., Disp: 30 tablet, Rfl: 11    ALLERGIES  Allergies   Allergen Reactions   • Penicillins Rash       FAMILY HISTORY:  Family History   Problem Relation Age of Onset   • Diabetes Other    • Hypertension Other    • Heart attack Other    • Hyperlipidemia Other    • Cancer Other        SOCIAL HISTORY  Social History     Socioeconomic History   • Marital status: Single   Tobacco Use   • Smoking status: Never   • Smokeless tobacco: Never   Vaping Use   • Vaping Use: Never used   Substance and Sexual Activity   • Alcohol use: Never   • Drug use: Never   • Sexual activity: Defer         PHYSICAL EXAM   /60 (BP Location: Left arm, Patient Position: Sitting, Cuff Size: Adult)   Pulse 85   Temp 97.3 °F (36.3 °C) (Temporal)   Ht 170.2 cm (67\")   Wt 122 kg (269 lb 3.2 oz)   BMI 42.16 kg/m²   Physical Exam  Constitutional:       Appearance: Normal appearance.   HENT:      Head: Normocephalic and atraumatic.   Pulmonary:      Effort: Pulmonary effort is normal.   Abdominal:      General: There is no distension.      Palpations: Abdomen is soft.   Neurological:      Mental Status: He is alert. Mental status is at baseline.   Psychiatric:         Mood and Affect: Mood normal.         Behavior: Behavior normal.           Results Review:  XR ABDOMEN KUB     Date of Exam: 2/21/2023 2:13 PM EST     Indication: constipation.     Comparison: CT abdomen and pelvis without contrast 2/25/2020     Findings:  Abdominal gas pattern is nonobstructive. Large amount of stool overlying the colon and rectum suggesting constipation. No abnormally dilated air-filled loops of small bowel. Osseous structures appear intact. No pneumoperitoneum within limits of   technique.     IMPRESSION:  Impression:  Large colonic stool burden most suggestive of " constipation.     Electronically Signed: Ector Montesinos    2/23/2023 12:02 PM EST    Workstation ID: [id number]    ASSESSMENT / PLAN  Diagnoses and all orders for this visit:    1. Gastroesophageal reflux disease, unspecified whether esophagitis present (Primary)  -     pantoprazole (PROTONIX) 40 MG EC tablet; Take 1 tablet by mouth Daily.  Dispense: 30 tablet; Refill: 11    2. Epigastric pain  -     Ambulatory referral for Screening EGD    3. Esophageal dysphagia  -     Ambulatory referral for Screening EGD    4. Irritable bowel syndrome with constipation  -     linaclotide (Linzess) 145 MCG capsule capsule; Take 1 capsule by mouth Every Morning Before Breakfast.  Dispense: 90 capsule; Refill: 1       >> Discontinue omeprazole  >> Recommend taking Linzess on a daily basis rather than as needed    Return for Follow up after procedures.    I discussed the patients findings and my recommendations with patient    LUIS Dickerson

## 2023-03-09 ENCOUNTER — HOSPITAL ENCOUNTER (OUTPATIENT)
Dept: ULTRASOUND IMAGING | Facility: HOSPITAL | Age: 23
Discharge: HOME | End: 2023-03-09
Admitting: FAMILY MEDICINE
Payer: MEDICAID

## 2023-03-09 PROCEDURE — 76536 US EXAM OF HEAD AND NECK: CPT

## 2023-03-17 DIAGNOSIS — R10.13 DYSPEPSIA: ICD-10-CM

## 2023-03-17 DIAGNOSIS — E03.9 HYPOTHYROIDISM (ACQUIRED): ICD-10-CM

## 2023-03-20 RX ORDER — LEVOTHYROXINE SODIUM 88 UG/1
TABLET ORAL
Qty: 30 TABLET | Refills: 0 | Status: SHIPPED | OUTPATIENT
Start: 2023-03-20

## 2023-03-20 RX ORDER — FAMOTIDINE 20 MG/1
TABLET, FILM COATED ORAL
Qty: 90 TABLET | Refills: 0 | OUTPATIENT
Start: 2023-03-20

## 2023-04-07 ENCOUNTER — TELEPHONE (OUTPATIENT)
Dept: FAMILY MEDICINE CLINIC | Facility: CLINIC | Age: 23
End: 2023-04-07
Payer: MEDICAID

## 2023-04-07 DIAGNOSIS — J06.9 ACUTE URI: ICD-10-CM

## 2023-04-07 RX ORDER — AZITHROMYCIN 250 MG/1
TABLET, FILM COATED ORAL
Qty: 6 TABLET | Refills: 0 | Status: SHIPPED | OUTPATIENT
Start: 2023-04-07

## 2023-04-07 NOTE — TELEPHONE ENCOUNTER
Earnest Patton and Jn Patton are in office today. Earnest has URI, states that Alexia has same symptoms and requesting antibiotic. Zpak sent to [facility]

## 2023-04-19 DIAGNOSIS — E03.9 HYPOTHYROIDISM (ACQUIRED): ICD-10-CM

## 2023-04-19 RX ORDER — LEVOTHYROXINE SODIUM 88 UG/1
TABLET ORAL
Qty: 30 TABLET | Refills: 0 | Status: SHIPPED | OUTPATIENT
Start: 2023-04-19

## 2023-04-20 ENCOUNTER — TELEPHONE (OUTPATIENT)
Dept: FAMILY MEDICINE CLINIC | Facility: CLINIC | Age: 23
End: 2023-04-20
Payer: MEDICAID

## 2023-04-20 DIAGNOSIS — H61.20 EXCESSIVE CERUMEN IN EAR CANAL, UNSPECIFIED LATERALITY: Primary | ICD-10-CM

## 2023-04-20 NOTE — TELEPHONE ENCOUNTER
Caller: Rubio Pattoncarlitos    Relationship: Mother    Best call back number:     [phone]     What is the medical concern/diagnosis:     EAR ISSUES    What specialty or service is being requested:     ENT    What is the provider, practice or medical service name:     CALLER REQUESTED A REFERRAL FOR THE SAME ENT PROVIDER AS SHE DOES    What is the office location:     [facility]    What is the office phone number:     [phone]    DR BARON

## 2023-04-20 NOTE — TELEPHONE ENCOUNTER
"Please get details of \"EAR ISSUES\" to know what to list as diagnosis for referral.   Looks like he was previously referred to Dr. Woodruff in 2021.   "

## 2023-04-21 NOTE — TELEPHONE ENCOUNTER
Spoke with mom states that his ears have been stopped up and he is having a hard time hearing.   Mother states that she wants him to be seen at [facility] on the second floor

## 2023-05-04 ENCOUNTER — TELEPHONE (OUTPATIENT)
Dept: GASTROENTEROLOGY | Facility: CLINIC | Age: 23
End: 2023-05-04
Payer: MEDICAID

## 2023-05-23 DIAGNOSIS — E03.9 HYPOTHYROIDISM (ACQUIRED): ICD-10-CM

## 2023-05-23 RX ORDER — LEVOTHYROXINE SODIUM 88 UG/1
TABLET ORAL
Qty: 30 TABLET | Refills: 0 | Status: SHIPPED | OUTPATIENT
Start: 2023-05-23

## 2023-08-04 DIAGNOSIS — J30.1 NON-SEASONAL ALLERGIC RHINITIS DUE TO POLLEN: ICD-10-CM

## 2023-08-04 RX ORDER — MONTELUKAST SODIUM 10 MG/1
TABLET ORAL
Qty: 90 TABLET | Refills: 3 | Status: SHIPPED | OUTPATIENT
Start: 2023-08-04

## 2023-08-14 DIAGNOSIS — E03.9 HYPOTHYROIDISM (ACQUIRED): ICD-10-CM

## 2023-08-15 RX ORDER — LEVOTHYROXINE SODIUM 88 UG/1
TABLET ORAL
Qty: 30 TABLET | Refills: 0 | Status: SHIPPED | OUTPATIENT
Start: 2023-08-15

## 2023-08-20 DIAGNOSIS — R51.9 DAILY HEADACHE: ICD-10-CM

## 2023-08-21 RX ORDER — TOPIRAMATE 50 MG/1
50 TABLET, FILM COATED ORAL 2 TIMES DAILY
Qty: 180 TABLET | Refills: 1 | Status: SHIPPED | OUTPATIENT
Start: 2023-08-21

## 2023-08-24 ENCOUNTER — OFFICE VISIT (OUTPATIENT)
Dept: FAMILY MEDICINE CLINIC | Facility: CLINIC | Age: 23
End: 2023-08-24
Payer: MEDICAID

## 2023-08-24 VITALS
TEMPERATURE: 98.4 F | HEIGHT: 67 IN | RESPIRATION RATE: 22 BRPM | SYSTOLIC BLOOD PRESSURE: 112 MMHG | WEIGHT: 274.8 LBS | HEART RATE: 84 BPM | OXYGEN SATURATION: 97 % | DIASTOLIC BLOOD PRESSURE: 72 MMHG | BODY MASS INDEX: 43.13 KG/M2

## 2023-08-24 DIAGNOSIS — M54.2 CERVICALGIA: ICD-10-CM

## 2023-08-24 DIAGNOSIS — H61.23 BILATERAL IMPACTED CERUMEN: Primary | ICD-10-CM

## 2023-08-24 PROCEDURE — 99213 OFFICE O/P EST LOW 20 MIN: CPT | Performed by: FAMILY MEDICINE

## 2023-08-24 NOTE — PROGRESS NOTES
Chief Complaint  Sore Throat (Started 3 days ago, also has pain in neck.) and Earache (Both ears have pressure, can't hear out of the right ear.)    Subjective          Lane Patton presents to [facility]  Sore Throat   This is a new problem. The current episode started in the past 7 days (3 days). Associated symptoms include ear pain. He has tried nothing for the symptoms.   Earache   There is pain in both ears. This is a new problem. The current episode started in the past 7 days (3 days). Associated symptoms include a sore throat. He has tried nothing for the symptoms. The treatment provided no relief.     Complains of neck ache and upper back pain  No injury  Interested in seeing chiropractor for treatment.     The following portions of the patient's history were reviewed and updated as appropriate: allergies, current medications, past family history, past medical history, past social history, past surgical history, and problem list.    Objective      Physical Exam  Vitals reviewed.   HENT:      Right Ear: There is impacted cerumen.      Left Ear: There is impacted cerumen.      Mouth/Throat:      Pharynx: Uvula midline. No posterior oropharyngeal erythema.   Cardiovascular:      Rate and Rhythm: Normal rate.      Heart sounds: Normal heart sounds.   Pulmonary:      Effort: Pulmonary effort is normal.      Breath sounds: Normal breath sounds.   Neurological:      Mental Status: He is alert.      Result Review :                Assessment and Plan    Diagnoses and all orders for this visit:    1. Bilateral impacted cerumen (Primary)    2. Cervicalgia  -     Ambulatory Referral to Chiropractic    He has been referred to ENT, but states that he has not been scheduled.   Checked on referral status today. Appt with Dr. Bronson 9/18 @ 2:00, information provided to Earnest.       Follow Up   No follow-ups on file.  Patient was given instructions and counseling regarding his  condition or for health maintenance advice. Please see specific information pulled into the AVS if appropriate.

## 2023-08-29 DIAGNOSIS — E55.9 VITAMIN D DEFICIENCY: ICD-10-CM

## 2023-08-29 RX ORDER — ERGOCALCIFEROL 1.25 MG/1
CAPSULE ORAL
Qty: 4 CAPSULE | Refills: 5 | Status: SHIPPED | OUTPATIENT
Start: 2023-08-29

## 2023-09-11 DIAGNOSIS — E03.9 HYPOTHYROIDISM (ACQUIRED): ICD-10-CM

## 2023-09-11 RX ORDER — LEVOTHYROXINE SODIUM 88 UG/1
TABLET ORAL
Qty: 30 TABLET | Refills: 0 | Status: SHIPPED | OUTPATIENT
Start: 2023-09-11

## 2023-09-19 DIAGNOSIS — J30.89 ALLERGIC RHINITIS DUE TO OTHER ALLERGIC TRIGGER, UNSPECIFIED SEASONALITY: ICD-10-CM

## 2023-09-19 RX ORDER — CETIRIZINE HYDROCHLORIDE 10 MG/1
TABLET ORAL
Qty: 90 TABLET | Refills: 2 | Status: SHIPPED | OUTPATIENT
Start: 2023-09-19

## 2023-10-10 ENCOUNTER — OFFICE VISIT (OUTPATIENT)
Dept: BEHAVIORAL HEALTH | Facility: CLINIC | Age: 23
End: 2023-10-10
Payer: MEDICAID

## 2023-10-10 VITALS
DIASTOLIC BLOOD PRESSURE: 74 MMHG | WEIGHT: 274 LBS | SYSTOLIC BLOOD PRESSURE: 106 MMHG | HEIGHT: 67 IN | BODY MASS INDEX: 43 KG/M2 | HEART RATE: 76 BPM

## 2023-10-10 DIAGNOSIS — F25.1 SCHIZOAFFECTIVE DISORDER, DEPRESSIVE TYPE: Primary | ICD-10-CM

## 2023-10-10 DIAGNOSIS — F51.5 NIGHTMARES: ICD-10-CM

## 2023-10-10 DIAGNOSIS — F99 INSOMNIA DUE TO OTHER MENTAL DISORDER: ICD-10-CM

## 2023-10-10 DIAGNOSIS — F51.05 INSOMNIA DUE TO OTHER MENTAL DISORDER: ICD-10-CM

## 2023-10-10 RX ORDER — PRAZOSIN HYDROCHLORIDE 2 MG/1
2 CAPSULE ORAL NIGHTLY
Qty: 30 CAPSULE | Refills: 2 | Status: SHIPPED | OUTPATIENT
Start: 2023-10-10

## 2023-10-10 RX ORDER — TRAZODONE HYDROCHLORIDE 100 MG/1
100-200 TABLET ORAL NIGHTLY
Qty: 60 TABLET | Refills: 2 | Status: SHIPPED | OUTPATIENT
Start: 2023-10-10

## 2023-10-10 RX ORDER — ARIPIPRAZOLE 5 MG/1
TABLET ORAL
Qty: 53 TABLET | Refills: 0 | Status: SHIPPED | OUTPATIENT
Start: 2023-10-10 | End: 2023-11-09
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
## 2023-10-12 ENCOUNTER — TELEPHONE (OUTPATIENT)
Dept: FAMILY MEDICINE CLINIC | Facility: CLINIC | Age: 23
End: 2023-10-12
Payer: MEDICAID

## 2023-10-26 ENCOUNTER — TELEPHONE (OUTPATIENT)
Dept: FAMILY MEDICINE CLINIC | Facility: CLINIC | Age: 23
End: 2023-10-26
Payer: MEDICAID

## 2023-10-26 DIAGNOSIS — E03.9 HYPOTHYROIDISM (ACQUIRED): ICD-10-CM

## 2023-10-26 RX ORDER — LEVOTHYROXINE SODIUM 88 UG/1
TABLET ORAL
Qty: 90 TABLET | Refills: 0 | Status: SHIPPED | OUTPATIENT
Start: 2023-10-26

## 2023-10-26 RX ORDER — LEVOTHYROXINE SODIUM 88 UG/1
TABLET ORAL
Qty: 30 TABLET | Refills: 0 | Status: SHIPPED | OUTPATIENT
Start: 2023-10-26 | End: 2023-10-26 | Stop reason: SDUPTHER

## 2023-10-26 NOTE — TELEPHONE ENCOUNTER
Caller: Earnest Patton    Relationship: Mother    Best call back number: [phone]     What is the best time to reach you: ANY    Who are you requesting to speak with (clinical staff, provider,  specific staff member): CLINICAL STAFF    Do you know the name of the person who called: EARNEST    What was the call regarding: PATIENT'S MOTHER IS REQUESTING A CALL BACK WHEN THE REFILL REQUEST FOR LEVOTHYROXINE HAS BEEN APPROVED/DENIED.    THIS REFILL WAS ALREADY REQUESTED BY THE PHARMACY, PENDING APPROVAL.     SHE STATED THAT HE HAS JUST 2 DAYS REMAINING.     PLEASE ADVISE

## 2023-10-31 ENCOUNTER — OFFICE VISIT (OUTPATIENT)
Dept: BEHAVIORAL HEALTH | Facility: CLINIC | Age: 23
End: 2023-10-31
Payer: MEDICAID

## 2023-10-31 VITALS
WEIGHT: 274 LBS | DIASTOLIC BLOOD PRESSURE: 76 MMHG | BODY MASS INDEX: 43 KG/M2 | SYSTOLIC BLOOD PRESSURE: 110 MMHG | HEIGHT: 67 IN

## 2023-10-31 DIAGNOSIS — F51.5 NIGHTMARES: ICD-10-CM

## 2023-10-31 DIAGNOSIS — F99 INSOMNIA DUE TO OTHER MENTAL DISORDER: ICD-10-CM

## 2023-10-31 DIAGNOSIS — F25.1 SCHIZOAFFECTIVE DISORDER, DEPRESSIVE TYPE: Primary | ICD-10-CM

## 2023-10-31 DIAGNOSIS — F51.05 INSOMNIA DUE TO OTHER MENTAL DISORDER: ICD-10-CM

## 2023-10-31 PROCEDURE — 99214 OFFICE O/P EST MOD 30 MIN: CPT

## 2023-10-31 PROCEDURE — 1160F RVW MEDS BY RX/DR IN RCRD: CPT

## 2023-10-31 PROCEDURE — 1159F MED LIST DOCD IN RCRD: CPT

## 2023-10-31 RX ORDER — ARIPIPRAZOLE 10 MG/1
10 TABLET ORAL DAILY
Qty: 30 TABLET | Refills: 2 | Status: SHIPPED | OUTPATIENT
Start: 2023-10-31

## 2023-10-31 NOTE — PROGRESS NOTES
"     Office  Follow Up Visit      Patient Name: Lane Patton  : 2000   MRN: [id number]     Referring Provider: Caity Simmons DO    Chief Complaint: Medication follow-up    ICD-10-CM ICD-9-CM   1. Schizoaffective disorder, depressive type  F25.1 295.70   2. Nightmares  F51.5 307.47   3. Insomnia due to other mental disorder  F51.05 300.9    F99 327.02        History of Present Illness:   Lane Patton is a 23 y.o. male who is here today for follow up related to medication management of schizoaffective disorder, nightmares, insomnia.  Patient was accompanied by his mother during the appointment.  Patient reports significant improvement in nightmares since reinitiating prazosin 2 mg nightly.  He also reports improvement with sleep onset since reinitiating trazodone.  Patient's mother affirms that she has seen improvement in his sleep but states \"he still struggle staying asleep at times.\"  Patient's mom reports that she has seen a significant improvement in his overall mood and behavior since initiating Abilify.  Patient's mom reports that he has been more engaged, coming out of his room more, and talking with his family.  Patient reports that he is experienced a significant decrease in hearing voices stating \"I only hear one right now.\"  Patient reports no noticeable adverse effects with current medication regimen.      Subjective      Review of Systems:   Review of Systems   Constitutional: Negative.    Respiratory: Negative.     Cardiovascular: Negative.    Gastrointestinal: Negative.    Genitourinary: Negative.    Musculoskeletal: Negative.    Neurological: Negative.    Psychiatric/Behavioral:  Positive for hallucinations.      Patient History:   The following portions of the patient's history were reviewed and updated as appropriate: allergies, current medications, past family history, past medical history, past social history, past surgical history and problem list.     Social:  No " change in interval history.    Medications:     Current Outpatient Medications:     ARIPiprazole (ABILIFY) 10 MG tablet, Take 1 tablet by mouth Daily., Disp: 30 tablet, Rfl: 2    cetirizine (zyrTEC) 10 MG tablet, TAKE ONE TABLET BY MOUTH DAILY, Disp: 90 tablet, Rfl: 2    famotidine (PEPCID) 20 MG tablet, Take 1 tablet by mouth At Night As Needed for Heartburn (for heartburn)., Disp: 90 tablet, Rfl: 0    hydrocortisone 0.5 % cream, Apply  topically to the appropriate area as directed 2 (Two) Times a Day., Disp: 28.35 g, Rfl: 0    levothyroxine (SYNTHROID, LEVOTHROID) 88 MCG tablet, TAKE ONE TABLET BY MOUTH EVERY MORNING ON AN EMPTY STOMACH 1 HOUR BEFORE FOOD OR OTHER MEDICATIONS, Disp: 90 tablet, Rfl: 0    linaclotide (Linzess) 145 MCG capsule capsule, Take 1 capsule by mouth Every Morning Before Breakfast., Disp: 90 capsule, Rfl: 1    methylphenidate 27 MG CR tablet, Take 1 tablet by mouth Every Morning, Disp: , Rfl:     montelukast (SINGULAIR) 10 MG tablet, TAKE ONE TABLET BY MOUTH ONCE NIGHTLY, Disp: 90 tablet, Rfl: 3    ondansetron ODT (ZOFRAN-ODT) 4 MG disintegrating tablet, DISSOLVE ONE TABLET BY MOUTH EVERY 8 HOURS AS NEEDED FOR NAUSEA AND VOMITING, Disp: 12 tablet, Rfl: 0    OXcarbazepine (TRILEPTAL) 150 MG tablet, , Disp: , Rfl:     pantoprazole (PROTONIX) 40 MG EC tablet, Take 1 tablet by mouth Daily., Disp: 30 tablet, Rfl: 11    pravastatin (Pravachol) 20 MG tablet, Take 1 tablet by mouth Daily., Disp: 90 tablet, Rfl: 3    prazosin (MINIPRESS) 2 MG capsule, Take 1 capsule by mouth Every Night., Disp: 30 capsule, Rfl: 2    SUMAtriptan (IMITREX) 100 MG tablet, Take 1 tab po prn migraine headache or stomach ache. Can repeat at 2hr prn. Max 2 in 24 hr., Disp: 9 tablet, Rfl: 0    topiramate (TOPAMAX) 50 MG tablet, Take 1 tablet by mouth 2 (Two) Times a Day., Disp: 180 tablet, Rfl: 1    traZODone (DESYREL) 100 MG tablet, Take 1-2 tablets by mouth Every Night., Disp: 60 tablet, Rfl: 2    vitamin D  "(ERGOCALCIFEROL) 1.25 MG (38303 UT) capsule capsule, TAKE ONE CAPSULE BY MOUTH ONCE WEEKLY, Disp: 4 capsule, Rfl: 5    Objective     Physical Exam:  Vital Signs:   Vitals:    10/31/23 1547   BP: 110/76   Weight: 124 kg (274 lb)   Height: 170.2 cm (67\")     Body mass index is 42.91 kg/m².     Mental Status Exam:   Hygiene:   fair  Cooperation:  Cooperative  Eye Contact:  Fair  Psychomotor Behavior:  Hyperactive  Affect:  Appropriate  Mood: euthymic  Speech:  Pressured  Thought Process:  Disorganized and Flight of ieas  Thought Content:  Bizarre  Suicidal:  None  Homicidal:  None  Hallucinations: Auditory  Delusion:  Unable to demonstrate  Memory:  Unable to evaluate  Orientation:  Grossly intact  Reliability:  poor  Insight:  Poor  Judgement:  Fair  Impulse Control:  Poor  Physical/Medical Issues:  Yes several medical comorbidities, see chart      Assessment / Plan      Visit Diagnosis/Orders Placed This Visit:  Diagnoses and all orders for this visit:    1. Schizoaffective disorder, depressive type (Primary)  -     ARIPiprazole (ABILIFY) 10 MG tablet; Take 1 tablet by mouth Daily.  Dispense: 30 tablet; Refill: 2    2. Nightmares    3. Insomnia due to other mental disorder         Differential:   N/A    Plan:   Increase Abilify to 10 mg daily.  Continue trazodone 100 to 200 mg nightly.  Continue prazosin 2 mg nightly.  Discussed behavioral modifications.    Continue supportive psychotherapy efforts and medications as indicated. Treatment and medication options discussed during today's visit. Patient ackowledged and verbally consented to continue with current treatment plan and was educated on the importance of compliance with treatment and follow-up appointments. Patient seems reasonably able to adhere to treatment plan.      Medication Considerations:  Discussed medication options and treatment plan of prescribed medication(s) as well as the risks, benefits, and potential side effects. Patient is agreeable to call " the office with any worsening of symptoms or onset of side effects. Patient is agreeable to call 911 or go to the nearest ER should he/she begin having SI/HI.    Quality Measures:   Never smoker    I advised Lane Patton of the risks of tobacco use.     Follow Up:   Return in about 4 weeks (around 11/28/2023) for Recheck.      LUIS Hargrove, PMHNP-BC

## 2023-11-22 DIAGNOSIS — F25.1 SCHIZOAFFECTIVE DISORDER, DEPRESSIVE TYPE: ICD-10-CM

## 2023-11-28 RX ORDER — ARIPIPRAZOLE 5 MG/1
TABLET ORAL
Qty: 53 TABLET | Refills: 0 | OUTPATIENT
Start: 2023-11-28

## 2024-01-17 ENCOUNTER — OFFICE VISIT (OUTPATIENT)
Dept: FAMILY MEDICINE CLINIC | Facility: CLINIC | Age: 24
End: 2024-01-17
Payer: MEDICAID

## 2024-01-17 VITALS
HEART RATE: 78 BPM | SYSTOLIC BLOOD PRESSURE: 118 MMHG | TEMPERATURE: 97.5 F | WEIGHT: 249.4 LBS | DIASTOLIC BLOOD PRESSURE: 80 MMHG | BODY MASS INDEX: 39.15 KG/M2 | HEIGHT: 67 IN | RESPIRATION RATE: 20 BRPM | OXYGEN SATURATION: 97 %

## 2024-01-17 DIAGNOSIS — E55.9 VITAMIN D DEFICIENCY: ICD-10-CM

## 2024-01-17 DIAGNOSIS — E04.2 MULTINODULAR THYROID: ICD-10-CM

## 2024-01-17 DIAGNOSIS — Z01.818 PRE-OP EXAM: Primary | ICD-10-CM

## 2024-01-17 PROCEDURE — 99213 OFFICE O/P EST LOW 20 MIN: CPT | Performed by: FAMILY MEDICINE

## 2024-01-17 NOTE — PROGRESS NOTES
Chief Complaint  Pre Op Clearance / dental procedure  (Scheduled for 2-7-24  )    Subjective          Lane Patton presents to [facility]  History of Present Illness    He has a dental cleaning and filling on 2/7/24.   He requires sedation for this. He has had underwent similar treatment and sedation in the past without complication.   Mom states that they are requesting CMP and CBC be collected prior to procedure.     Also, needs to update thyroid labs     The following portions of the patient's history were reviewed and updated as appropriate: allergies, current medications, past family history, past medical history, past social history, past surgical history, and problem list.    Objective      Physical Exam  Vitals reviewed.   Constitutional:       Appearance: He is obese.   Cardiovascular:      Rate and Rhythm: Normal rate.      Heart sounds: Normal heart sounds.   Pulmonary:      Effort: Pulmonary effort is normal.      Breath sounds: Normal breath sounds.   Musculoskeletal:      Cervical back: Neck supple.   Neurological:      Mental Status: He is alert.        Result Review :                Assessment and Plan    Diagnoses and all orders for this visit:    1. Pre-op exam (Primary)  -     Comprehensive Metabolic Panel  -     CBC & Differential  -     TSH  -     Vitamin D,25-Hydroxy    2. Vitamin D deficiency  -     Comprehensive Metabolic Panel  -     CBC & Differential  -     TSH  -     Vitamin D,25-Hydroxy    3. Multinodular thyroid  -     Comprehensive Metabolic Panel  -     CBC & Differential  -     TSH  -     Vitamin D,25-Hydroxy    Labs completed. Ok to proceed with planned dental procedure.       Follow Up   Return if symptoms worsen or fail to improve.  Patient was given instructions and counseling regarding his condition or for health maintenance advice. Please see specific information pulled into the AVS if appropriate.

## 2024-01-18 LAB
25(OH)D3+25(OH)D2 SERPL-MCNC: 31.3 NG/ML (ref 30–100)
ALBUMIN SERPL-MCNC: 5.4 G/DL (ref 4.3–5.2)
ALBUMIN/GLOB SERPL: 1.8 {RATIO} (ref 1.2–2.2)
ALP SERPL-CCNC: 51 IU/L (ref 44–121)
ALT SERPL-CCNC: 35 IU/L (ref 0–44)
AST SERPL-CCNC: 21 IU/L (ref 0–40)
BASOPHILS # BLD AUTO: 0 X10E3/UL (ref 0–0.2)
BASOPHILS NFR BLD AUTO: 0 %
BILIRUB SERPL-MCNC: 0.3 MG/DL (ref 0–1.2)
BUN SERPL-MCNC: 12 MG/DL (ref 6–20)
BUN/CREAT SERPL: 10 (ref 9–20)
CALCIUM SERPL-MCNC: 10.2 MG/DL (ref 8.7–10.2)
CHLORIDE SERPL-SCNC: 104 MMOL/L (ref 96–106)
CO2 SERPL-SCNC: 22 MMOL/L (ref 20–29)
CREAT SERPL-MCNC: 1.2 MG/DL (ref 0.76–1.27)
EGFRCR SERPLBLD CKD-EPI 2021: 87 ML/MIN/1.73
EOSINOPHIL # BLD AUTO: 0.1 X10E3/UL (ref 0–0.4)
EOSINOPHIL NFR BLD AUTO: 1 %
ERYTHROCYTE [DISTWIDTH] IN BLOOD BY AUTOMATED COUNT: 13.6 % (ref 11.6–15.4)
GLOBULIN SER CALC-MCNC: 3 G/DL (ref 1.5–4.5)
GLUCOSE SERPL-MCNC: 89 MG/DL (ref 70–99)
HCT VFR BLD AUTO: 53 % (ref 37.5–51)
HGB BLD-MCNC: 17.7 G/DL (ref 13–17.7)
IMM GRANULOCYTES # BLD AUTO: 0 X10E3/UL (ref 0–0.1)
IMM GRANULOCYTES NFR BLD AUTO: 0 %
LYMPHOCYTES # BLD AUTO: 3.1 X10E3/UL (ref 0.7–3.1)
LYMPHOCYTES NFR BLD AUTO: 32 %
MCH RBC QN AUTO: 28.1 PG (ref 26.6–33)
MCHC RBC AUTO-ENTMCNC: 33.4 G/DL (ref 31.5–35.7)
MCV RBC AUTO: 84 FL (ref 79–97)
MONOCYTES # BLD AUTO: 0.7 X10E3/UL (ref 0.1–0.9)
MONOCYTES NFR BLD AUTO: 8 %
NEUTROPHILS # BLD AUTO: 5.6 X10E3/UL (ref 1.4–7)
NEUTROPHILS NFR BLD AUTO: 59 %
PLATELET # BLD AUTO: 324 X10E3/UL (ref 150–450)
POTASSIUM SERPL-SCNC: 4.4 MMOL/L (ref 3.5–5.2)
PROT SERPL-MCNC: 8.4 G/DL (ref 6–8.5)
RBC # BLD AUTO: 6.29 X10E6/UL (ref 4.14–5.8)
SODIUM SERPL-SCNC: 143 MMOL/L (ref 134–144)
TSH SERPL DL<=0.005 MIU/L-ACNC: 0.67 UIU/ML (ref 0.45–4.5)
WBC # BLD AUTO: 9.6 X10E3/UL (ref 3.4–10.8)

## 2024-01-19 ENCOUNTER — OFFICE VISIT (OUTPATIENT)
Dept: BEHAVIORAL HEALTH | Facility: CLINIC | Age: 24
End: 2024-01-19
Payer: MEDICAID

## 2024-01-19 VITALS
HEART RATE: 76 BPM | SYSTOLIC BLOOD PRESSURE: 136 MMHG | DIASTOLIC BLOOD PRESSURE: 69 MMHG | HEIGHT: 67 IN | WEIGHT: 248 LBS | BODY MASS INDEX: 38.92 KG/M2

## 2024-01-19 DIAGNOSIS — F51.05 INSOMNIA DUE TO OTHER MENTAL DISORDER: ICD-10-CM

## 2024-01-19 DIAGNOSIS — T50.905A ACUTE MEDICATION-INDUCED AKATHISIA: ICD-10-CM

## 2024-01-19 DIAGNOSIS — R71.8 ELEVATED HEMATOCRIT: Primary | ICD-10-CM

## 2024-01-19 DIAGNOSIS — F51.5 NIGHTMARES: ICD-10-CM

## 2024-01-19 DIAGNOSIS — F25.1 SCHIZOAFFECTIVE DISORDER, DEPRESSIVE TYPE: Primary | ICD-10-CM

## 2024-01-19 DIAGNOSIS — R79.89 ABNORMAL CBC: ICD-10-CM

## 2024-01-19 DIAGNOSIS — F99 INSOMNIA DUE TO OTHER MENTAL DISORDER: ICD-10-CM

## 2024-01-19 DIAGNOSIS — G25.71 ACUTE MEDICATION-INDUCED AKATHISIA: ICD-10-CM

## 2024-01-19 RX ORDER — TRAZODONE HYDROCHLORIDE 100 MG/1
100-200 TABLET ORAL NIGHTLY
Qty: 60 TABLET | Refills: 2 | Status: SHIPPED | OUTPATIENT
Start: 2024-01-19

## 2024-01-19 RX ORDER — PROPRANOLOL HYDROCHLORIDE 10 MG/1
10 TABLET ORAL 2 TIMES DAILY
Qty: 60 TABLET | Refills: 2 | Status: SHIPPED | OUTPATIENT
Start: 2024-01-19

## 2024-01-19 NOTE — PROGRESS NOTES
"     Office  Follow Up Visit      Patient Name: Lane Patton  : 2000   MRN: [id number]     Referring Provider: Caity Simmons DO    Chief Complaint: Medication follow-up    ICD-10-CM ICD-9-CM   1. Schizoaffective disorder, depressive type  F25.1 295.70   2. Acute medication-induced akathisia  G25.71 333.99    T50.905A E947.9   3. Nightmares  F51.5 307.47        History of Present Illness:   Lane Patton is a 23 y.o. male who is here today for follow up related to medication management of schizoaffective disorder.  Patient was accompanied by his mother for collateral.  Patient's mother reports that she has seen continued improvement in his overall mood and a decrease in his hallucinations since increasing Abilify to 10 mg.  Patient affirms that he has experienced less auditory hallucinations.  However, patient reports that he has felt an increase in anxiety and states \"it feels like I cannot sit still or if I lay down I have to get right back up and do something.\"  Others and experienced akathisia both patient and his mother affirm improvement in overall symptoms and mood stability.  He reports that he had nightmares only 1 night with continued prazosin use.      Subjective      Review of Systems:   Review of Systems   Constitutional: Negative.    Respiratory: Negative.     Cardiovascular: Negative.    Gastrointestinal: Negative.    Genitourinary: Negative.    Musculoskeletal: Negative.    Skin: Negative.    Neurological: Negative.    Psychiatric/Behavioral:          Akathisia       Patient History:   The following portions of the patient's history were reviewed and updated as appropriate: allergies, current medications, past family history, past medical history, past social history, past surgical history and problem list.     Social:  No change in interval history.    Medications:     Current Outpatient Medications:     ARIPiprazole (ABILIFY) 10 MG tablet, Take 1 tablet by mouth " Daily., Disp: 30 tablet, Rfl: 2    cetirizine (zyrTEC) 10 MG tablet, TAKE ONE TABLET BY MOUTH DAILY, Disp: 90 tablet, Rfl: 2    famotidine (PEPCID) 20 MG tablet, Take 1 tablet by mouth At Night As Needed for Heartburn (for heartburn)., Disp: 90 tablet, Rfl: 0    hydrocortisone 0.5 % cream, Apply  topically to the appropriate area as directed 2 (Two) Times a Day., Disp: 28.35 g, Rfl: 0    levothyroxine (SYNTHROID, LEVOTHROID) 88 MCG tablet, TAKE ONE TABLET BY MOUTH EVERY MORNING ON AN EMPTY STOMACH 1 HOUR BEFORE FOOD OR OTHER MEDICATIONS, Disp: 90 tablet, Rfl: 0    linaclotide (Linzess) 145 MCG capsule capsule, Take 1 capsule by mouth Every Morning Before Breakfast., Disp: 90 capsule, Rfl: 1    methylphenidate 27 MG CR tablet, Take 1 tablet by mouth Every Morning, Disp: , Rfl:     montelukast (SINGULAIR) 10 MG tablet, TAKE ONE TABLET BY MOUTH ONCE NIGHTLY, Disp: 90 tablet, Rfl: 3    ondansetron ODT (ZOFRAN-ODT) 4 MG disintegrating tablet, DISSOLVE ONE TABLET BY MOUTH EVERY 8 HOURS AS NEEDED FOR NAUSEA AND VOMITING, Disp: 12 tablet, Rfl: 0    OXcarbazepine (TRILEPTAL) 150 MG tablet, , Disp: , Rfl:     pantoprazole (PROTONIX) 40 MG EC tablet, Take 1 tablet by mouth Daily., Disp: 30 tablet, Rfl: 11    pravastatin (Pravachol) 20 MG tablet, Take 1 tablet by mouth Daily., Disp: 90 tablet, Rfl: 3    prazosin (MINIPRESS) 2 MG capsule, Take 1 capsule by mouth Every Night., Disp: 30 capsule, Rfl: 2    propranolol (INDERAL) 10 MG tablet, Take 1 tablet by mouth 2 (Two) Times a Day., Disp: 60 tablet, Rfl: 2    SUMAtriptan (IMITREX) 100 MG tablet, Take 1 tab po prn migraine headache or stomach ache. Can repeat at 2hr prn. Max 2 in 24 hr., Disp: 9 tablet, Rfl: 0    topiramate (TOPAMAX) 50 MG tablet, Take 1 tablet by mouth 2 (Two) Times a Day., Disp: 180 tablet, Rfl: 1    traZODone (DESYREL) 100 MG tablet, TAKE ONE TO TWO TABLETS BY MOUTH ONCE NIGHTLY, Disp: 60 tablet, Rfl: 2    vitamin D (ERGOCALCIFEROL) 1.25 MG (02218 UT) capsule  "capsule, TAKE ONE CAPSULE BY MOUTH ONCE WEEKLY, Disp: 4 capsule, Rfl: 5    Objective     Physical Exam:  Vital Signs:   Vitals:    01/19/24 0952   BP: 136/69   Pulse: 76   Weight: 112 kg (248 lb)   Height: 170.2 cm (67\")     Body mass index is 38.84 kg/m².     Mental Status Exam:   Hygiene:   fair  Cooperation:  Cooperative  Eye Contact:  Good  Psychomotor Behavior:  Appropriate  Affect:  Restricted  Mood: euthymic  Speech:  Monotone  Thought Process:  Circum  Thought Content:  Mood congruent  Suicidal:  None  Homicidal:  None  Hallucinations:  None  Delusion:  None  Memory:  Intact  Orientation:  Grossly intact  Reliability:  good  Insight:  Fair  Judgement:  Fair  Impulse Control:  Fair  Physical/Medical Issues:  Yes several medical comorbidities, see chart      Assessment / Plan      Visit Diagnosis/Orders Placed This Visit:  Diagnoses and all orders for this visit:    1. Schizoaffective disorder, depressive type (Primary)    2. Acute medication-induced akathisia  -     propranolol (INDERAL) 10 MG tablet; Take 1 tablet by mouth 2 (Two) Times a Day.  Dispense: 60 tablet; Refill: 2    3. Nightmares         Differential:   N/A    Plan:   Continue Abilify 10 mg daily at this time.  Initiate propranolol 10 mg tablet twice daily.    If akathisia does not resolve we will look to decrease Abilify before consideration of switching medications related to continued improvement of symptomology.  Continue prazosin 2 mg nightly.  Patient's mother was instructed to contact me after a week if akathisia does not improve.    Continue supportive psychotherapy efforts and medications as indicated. Treatment and medication options discussed during today's visit. Patient ackowledged and verbally consented to continue with current treatment plan and was educated on the importance of compliance with treatment and follow-up appointments. Patient seems reasonably able to adhere to treatment plan.      Medication Considerations:  Discussed " medication options and treatment plan of prescribed medication(s) as well as the risks, benefits, and potential side effects. Patient is agreeable to call the office with any worsening of symptoms or onset of side effects. Patient is agreeable to call 911 or go to the nearest ER should he/she begin having SI/HI.    Quality Measures:   Never smoker    I advised Lane Patton of the risks of tobacco use.     Follow Up:   Return in about 2 months (around 3/19/2024) for Recheck.      LUIS Hargrove, PMHNP-BC

## 2024-01-28 DIAGNOSIS — F25.1 SCHIZOAFFECTIVE DISORDER, DEPRESSIVE TYPE: ICD-10-CM

## 2024-01-30 DIAGNOSIS — F51.5 NIGHTMARES: ICD-10-CM

## 2024-01-30 RX ORDER — ARIPIPRAZOLE 10 MG/1
10 TABLET ORAL DAILY
Qty: 30 TABLET | Refills: 2 | Status: SHIPPED | OUTPATIENT
Start: 2024-01-30

## 2024-01-30 RX ORDER — PRAZOSIN HYDROCHLORIDE 2 MG/1
2 CAPSULE ORAL NIGHTLY
Qty: 30 CAPSULE | Refills: 2 | Status: SHIPPED | OUTPATIENT
Start: 2024-01-30

## 2024-02-24 DIAGNOSIS — R51.9 DAILY HEADACHE: ICD-10-CM

## 2024-02-26 RX ORDER — TOPIRAMATE 50 MG/1
50 TABLET, FILM COATED ORAL 2 TIMES DAILY
Qty: 180 TABLET | Refills: 1 | Status: SHIPPED | OUTPATIENT
Start: 2024-02-26

## 2024-02-27 DIAGNOSIS — E78.2 MIXED HYPERLIPIDEMIA: Primary | ICD-10-CM

## 2024-02-27 RX ORDER — PRAVASTATIN SODIUM 20 MG
20 TABLET ORAL DAILY
Qty: 90 TABLET | Refills: 0 | Status: SHIPPED | OUTPATIENT
Start: 2024-02-27

## 2024-03-04 DIAGNOSIS — E55.9 VITAMIN D DEFICIENCY: ICD-10-CM

## 2024-03-04 RX ORDER — ERGOCALCIFEROL 1.25 MG/1
50000 CAPSULE ORAL WEEKLY
Qty: 4 CAPSULE | Refills: 5 | Status: SHIPPED | OUTPATIENT
Start: 2024-03-04

## 2024-03-07 DIAGNOSIS — E03.9 HYPOTHYROIDISM (ACQUIRED): ICD-10-CM

## 2024-03-07 RX ORDER — LEVOTHYROXINE SODIUM 88 UG/1
TABLET ORAL
Qty: 90 TABLET | Refills: 0 | Status: SHIPPED | OUTPATIENT
Start: 2024-03-07

## 2024-03-19 DIAGNOSIS — K21.9 GASTROESOPHAGEAL REFLUX DISEASE, UNSPECIFIED WHETHER ESOPHAGITIS PRESENT: ICD-10-CM

## 2024-03-19 RX ORDER — PANTOPRAZOLE SODIUM 40 MG/1
40 TABLET, DELAYED RELEASE ORAL DAILY
Qty: 90 TABLET | OUTPATIENT
Start: 2024-03-19

## 2024-03-19 NOTE — TELEPHONE ENCOUNTER
I called patient and no answer. I left a voice message informing him it was time to schedule his yearly follow up with Alfredito SMITH. I also left my contact number to schedule at his convenience.

## 2024-03-27 ENCOUNTER — OFFICE VISIT (OUTPATIENT)
Dept: BEHAVIORAL HEALTH | Facility: CLINIC | Age: 24
End: 2024-03-27
Payer: MEDICAID

## 2024-03-27 VITALS
WEIGHT: 248 LBS | HEART RATE: 78 BPM | BODY MASS INDEX: 38.92 KG/M2 | HEIGHT: 67 IN | DIASTOLIC BLOOD PRESSURE: 65 MMHG | SYSTOLIC BLOOD PRESSURE: 113 MMHG

## 2024-03-27 DIAGNOSIS — G25.71 ACUTE MEDICATION-INDUCED AKATHISIA: ICD-10-CM

## 2024-03-27 DIAGNOSIS — F25.1 SCHIZOAFFECTIVE DISORDER, DEPRESSIVE TYPE: Primary | ICD-10-CM

## 2024-03-27 DIAGNOSIS — F51.5 NIGHTMARES: ICD-10-CM

## 2024-03-27 DIAGNOSIS — F90.9 ATTENTION DEFICIT HYPERACTIVITY DISORDER (ADHD), UNSPECIFIED ADHD TYPE: ICD-10-CM

## 2024-03-27 DIAGNOSIS — F51.05 INSOMNIA DUE TO OTHER MENTAL DISORDER: ICD-10-CM

## 2024-03-27 DIAGNOSIS — T50.905A ACUTE MEDICATION-INDUCED AKATHISIA: ICD-10-CM

## 2024-03-27 DIAGNOSIS — F99 INSOMNIA DUE TO OTHER MENTAL DISORDER: ICD-10-CM

## 2024-03-27 RX ORDER — ARIPIPRAZOLE 10 MG/1
10 TABLET ORAL DAILY
Qty: 30 TABLET | Refills: 2 | Status: SHIPPED | OUTPATIENT
Start: 2024-03-27

## 2024-03-27 RX ORDER — PRAZOSIN HYDROCHLORIDE 2 MG/1
2 CAPSULE ORAL NIGHTLY
Qty: 90 CAPSULE | Refills: 1 | Status: SHIPPED | OUTPATIENT
Start: 2024-03-27

## 2024-03-27 RX ORDER — PROPRANOLOL HYDROCHLORIDE 10 MG/1
20 TABLET ORAL 2 TIMES DAILY
Qty: 60 TABLET | Refills: 2 | Status: SHIPPED | OUTPATIENT
Start: 2024-03-27 | End: 2024-03-27

## 2024-03-27 RX ORDER — PROPRANOLOL HYDROCHLORIDE 20 MG/1
20 TABLET ORAL 2 TIMES DAILY
Qty: 60 TABLET | Refills: 3 | Status: SHIPPED | OUTPATIENT
Start: 2024-03-27

## 2024-03-27 RX ORDER — CLONIDINE HYDROCHLORIDE 0.1 MG/1
0.1 TABLET, EXTENDED RELEASE ORAL NIGHTLY
Qty: 30 TABLET | Refills: 1 | Status: SHIPPED | OUTPATIENT
Start: 2024-03-27

## 2024-03-27 NOTE — PROGRESS NOTES
Office  Follow Up Visit      Patient Name: Lane Patton  : 2000   MRN: [id number]     Referring Provider: Caity Simmons DO    Chief Complaint: Medication follow-up    ICD-10-CM ICD-9-CM   1. Schizoaffective disorder, depressive type  F25.1 295.70   2. Acute medication-induced akathisia  G25.71 333.99    T50.905A E947.9   3. Nightmares  F51.5 307.47   4. Insomnia due to other mental disorder  F51.05 300.9    F99 327.02   5. Attention deficit hyperactivity disorder (ADHD), unspecified ADHD type  F90.9 314.01        History of Present Illness:   Lane Patton is a 23 y.o. male who is here today for follow up related to medication management of schizoaffective disorder, acute medication induced akathisia, nightmares, and ADHD.  Patient was accompanied by his mother for the duration of the appointment.  She reports that she is continually seen improvement in his overall symptoms.  She reports that he has been more engaged, a decrease in behavioral problems, has been washing his close, and overall just seems to be in a better mood.  She reports that she feels the akathisia has minimized but still reports at times he feels the need to constantly be moving.  She reports that he has not told her that nightmares have been a problem with continued prazosin use.  She reports that he has been more impulsive and is eating.  Patient reports that he has had a more difficult time falling asleep.  He reports continued racing thoughts.  He typically will fall asleep around 3 or 4 AM in the 1 asleep and past noon.  Patient's mother reports when he is more active and engaged throughout his day he will fall asleep a lot quicker once he takes his nighttime medication including trazodone 200 mg, and prazosin 2 mg.      Subjective      Review of Systems:   Review of Systems   Constitutional: Negative.    Respiratory: Negative.     Cardiovascular: Negative.    Gastrointestinal: Negative.    Genitourinary:  Negative.    Musculoskeletal: Negative.    Neurological: Negative.    Psychiatric/Behavioral:  Positive for sleep disturbance. The patient is hyperactive.      Patient History:   The following portions of the patient's history were reviewed and updated as appropriate: allergies, current medications, past family history, past medical history, past social history, past surgical history and problem list.     Social:  No change in interval history.    Medications:     Current Outpatient Medications:     ARIPiprazole (ABILIFY) 10 MG tablet, Take 1 tablet by mouth Daily., Disp: 30 tablet, Rfl: 2    prazosin (MINIPRESS) 2 MG capsule, Take 1 capsule by mouth Every Night., Disp: 90 capsule, Rfl: 1    propranolol (INDERAL) 20 MG tablet, Take 1 tablet by mouth 2 (Two) Times a Day., Disp: 60 tablet, Rfl: 3    cetirizine (zyrTEC) 10 MG tablet, TAKE ONE TABLET BY MOUTH DAILY, Disp: 90 tablet, Rfl: 2    cloNIDine HCl ER 0.1 MG tablet sustained-release 12 hour tablet, Take 1 tablet by mouth Every Night., Disp: 30 tablet, Rfl: 1    famotidine (PEPCID) 20 MG tablet, Take 1 tablet by mouth At Night As Needed for Heartburn (for heartburn)., Disp: 90 tablet, Rfl: 0    hydrocortisone 0.5 % cream, Apply  topically to the appropriate area as directed 2 (Two) Times a Day., Disp: 28.35 g, Rfl: 0    levothyroxine (SYNTHROID, LEVOTHROID) 88 MCG tablet, TAKE ONE TABLET BY MOUTH EVERY MORNING ON AN EMPTY STOMACH 1 HOUR BEFORE FOOD OR OTHER MEDICATIONS, Disp: 90 tablet, Rfl: 0    linaclotide (Linzess) 145 MCG capsule capsule, Take 1 capsule by mouth Every Morning Before Breakfast., Disp: 90 capsule, Rfl: 1    montelukast (SINGULAIR) 10 MG tablet, TAKE ONE TABLET BY MOUTH ONCE NIGHTLY, Disp: 90 tablet, Rfl: 3    ondansetron ODT (ZOFRAN-ODT) 4 MG disintegrating tablet, DISSOLVE ONE TABLET BY MOUTH EVERY 8 HOURS AS NEEDED FOR NAUSEA AND VOMITING, Disp: 12 tablet, Rfl: 0    OXcarbazepine (TRILEPTAL) 150 MG tablet, , Disp: , Rfl:     pantoprazole  "(PROTONIX) 40 MG EC tablet, Take 1 tablet by mouth Daily., Disp: 30 tablet, Rfl: 11    pravastatin (PRAVACHOL) 20 MG tablet, TAKE ONE TABLET BY MOUTH DAILY, Disp: 90 tablet, Rfl: 0    SUMAtriptan (IMITREX) 100 MG tablet, Take 1 tab po prn migraine headache or stomach ache. Can repeat at 2hr prn. Max 2 in 24 hr., Disp: 9 tablet, Rfl: 0    topiramate (TOPAMAX) 50 MG tablet, TAKE 1 TABLET BY MOUTH TWICE A DAY, Disp: 180 tablet, Rfl: 1    traZODone (DESYREL) 100 MG tablet, TAKE ONE TO TWO TABLETS BY MOUTH ONCE NIGHTLY, Disp: 60 tablet, Rfl: 2    vitamin D (ERGOCALCIFEROL) 1.25 MG (49360 UT) capsule capsule, TAKE 1 CAPSULE BY MOUTH ONCE WEEKLY, Disp: 4 capsule, Rfl: 5    Objective     Physical Exam:  Vital Signs:   Vitals:    03/27/24 1511   BP: 113/65   Pulse: 78   Weight: 112 kg (248 lb)   Height: 170.2 cm (67\")     Body mass index is 38.84 kg/m².     Mental Status Exam:   Hygiene:   fair  Cooperation:  Cooperative  Eye Contact:  Fair  Psychomotor Behavior:  Appropriate  Affect:  Appropriate  Mood: normal  Speech:  Pressured  Thought Process: Tangential  Thought Content:  Mood congruent  Suicidal:  None  Homicidal:  None  Hallucinations:  Not demonstrated today  Delusion:  Unable to demonstrate  Memory:  Intact  Orientation:  Grossly intact  Reliability:  fair  Insight:  Poor  Judgement:  Fair  Impulse Control:  Poor  Physical/Medical Issues:  Yes several medical comorbidities, see chart      Assessment / Plan      Visit Diagnosis/Orders Placed This Visit:  Diagnoses and all orders for this visit:    1. Schizoaffective disorder, depressive type (Primary)  -     ARIPiprazole (ABILIFY) 10 MG tablet; Take 1 tablet by mouth Daily.  Dispense: 30 tablet; Refill: 2    2. Acute medication-induced akathisia  -     Discontinue: propranolol (INDERAL) 10 MG tablet; Take 2 tablets by mouth 2 (Two) Times a Day.  Dispense: 60 tablet; Refill: 2  -     propranolol (INDERAL) 20 MG tablet; Take 1 tablet by mouth 2 (Two) Times a Day.  " Dispense: 60 tablet; Refill: 3    3. Nightmares  -     prazosin (MINIPRESS) 2 MG capsule; Take 1 capsule by mouth Every Night.  Dispense: 90 capsule; Refill: 1    4. Insomnia due to other mental disorder  -     cloNIDine HCl ER 0.1 MG tablet sustained-release 12 hour tablet; Take 1 tablet by mouth Every Night.  Dispense: 30 tablet; Refill: 1    5. Attention deficit hyperactivity disorder (ADHD), unspecified ADHD type  -     cloNIDine HCl ER 0.1 MG tablet sustained-release 12 hour tablet; Take 1 tablet by mouth Every Night.  Dispense: 30 tablet; Refill: 1         Differential:   N/A    Plan:   Continue Abilify 10 mg daily.  Increase propranolol to 20 mg twice daily.  Continue prazosin 2 mg nightly.  Initiate clonidine ER 0.1 mg.  Sleep hygiene.  Discussed with mother and patient about implementing a more regimented schedule to help promote a better sleep schedule.    Continue supportive psychotherapy efforts and medications as indicated. Treatment and medication options discussed during today's visit. Patient ackowledged and verbally consented to continue with current treatment plan and was educated on the importance of compliance with treatment and follow-up appointments. Patient seems reasonably able to adhere to treatment plan.      Medication Considerations:  Discussed medication options and treatment plan of prescribed medication(s) as well as the risks, benefits, and potential side effects. Patient is agreeable to call the office with any worsening of symptoms or onset of side effects. Patient is agreeable to call 911 or go to the nearest ER should he/she begin having SI/HI.    Quality Measures:   Never smoker    I advised Lane Patton of the risks of tobacco use.     Follow Up:   Return in about 4 weeks (around 4/24/2024) for Recheck.      Tunde Ventura, LUIS, PMHNP-BC

## 2024-04-04 ENCOUNTER — OFFICE VISIT (OUTPATIENT)
Dept: FAMILY MEDICINE CLINIC | Facility: CLINIC | Age: 24
End: 2024-04-04
Payer: MEDICAID

## 2024-04-04 VITALS
SYSTOLIC BLOOD PRESSURE: 94 MMHG | BODY MASS INDEX: 37.42 KG/M2 | WEIGHT: 238.4 LBS | DIASTOLIC BLOOD PRESSURE: 58 MMHG | HEART RATE: 68 BPM | TEMPERATURE: 97.1 F | RESPIRATION RATE: 18 BRPM | OXYGEN SATURATION: 97 % | HEIGHT: 67 IN

## 2024-04-04 DIAGNOSIS — R05.1 ACUTE COUGH: ICD-10-CM

## 2024-04-04 DIAGNOSIS — J01.00 ACUTE NON-RECURRENT MAXILLARY SINUSITIS: Primary | ICD-10-CM

## 2024-04-04 PROCEDURE — 1160F RVW MEDS BY RX/DR IN RCRD: CPT | Performed by: FAMILY MEDICINE

## 2024-04-04 PROCEDURE — 1159F MED LIST DOCD IN RCRD: CPT | Performed by: FAMILY MEDICINE

## 2024-04-04 PROCEDURE — 99213 OFFICE O/P EST LOW 20 MIN: CPT | Performed by: FAMILY MEDICINE

## 2024-04-04 RX ORDER — BENZONATATE 100 MG/1
100 CAPSULE ORAL 3 TIMES DAILY PRN
Qty: 15 CAPSULE | Refills: 0 | Status: SHIPPED | OUTPATIENT
Start: 2024-04-04

## 2024-04-04 RX ORDER — AZITHROMYCIN 250 MG/1
TABLET, FILM COATED ORAL
Qty: 6 TABLET | Refills: 0 | Status: SHIPPED | OUTPATIENT
Start: 2024-04-04

## 2024-04-04 NOTE — PROGRESS NOTES
Chief Complaint  Headache (Sneezing, headache, eye pain from sinus pressure, sore throat, watery eyes, started Tuesday.)    Subjective          Lane Patton presents to [facility]  URI   This is a new problem. The current episode started in the past 7 days. Associated symptoms include congestion, coughing, headaches, a plugged ear sensation, sinus pain and sneezing. He has tried acetaminophen and NSAIDs for the symptoms. The treatment provided mild relief.         The following portions of the patient's history were reviewed and updated as appropriate: allergies, current medications, past family history, past medical history, past social history, past surgical history, and problem list.    Objective      Physical Exam  Vitals reviewed.   HENT:      Right Ear: There is impacted cerumen.      Left Ear: A middle ear effusion is present. Tympanic membrane is not injected or erythematous.   Cardiovascular:      Rate and Rhythm: Normal rate.      Heart sounds: Normal heart sounds.   Pulmonary:      Effort: Pulmonary effort is normal.      Breath sounds: Normal breath sounds. No wheezing or rhonchi.   Neurological:      Mental Status: He is alert.        Result Review :                Assessment and Plan    Diagnoses and all orders for this visit:    1. Acute non-recurrent maxillary sinusitis (Primary)  -     azithromycin (Zithromax Z-Shaun) 250 MG tablet; Take 2 tablets by mouth on day 1, then 1 tablet daily on days 2-5  Dispense: 6 tablet; Refill: 0    2. Acute cough  -     benzonatate (TESSALON) 100 MG capsule; Take 1 capsule by mouth 3 (Three) Times a Day As Needed for Cough.  Dispense: 15 capsule; Refill: 0    Follow up if no improvement      Follow Up   Return if symptoms worsen or fail to improve.  Patient was given instructions and counseling regarding his condition or for health maintenance advice. Please see specific information pulled into the AVS if appropriate.

## 2024-04-24 DIAGNOSIS — F99 INSOMNIA DUE TO OTHER MENTAL DISORDER: ICD-10-CM

## 2024-04-24 DIAGNOSIS — F51.05 INSOMNIA DUE TO OTHER MENTAL DISORDER: ICD-10-CM

## 2024-04-24 RX ORDER — TRAZODONE HYDROCHLORIDE 100 MG/1
100-200 TABLET ORAL NIGHTLY
Qty: 60 TABLET | Refills: 2 | Status: SHIPPED | OUTPATIENT
Start: 2024-04-24

## 2024-05-24 ENCOUNTER — OFFICE VISIT (OUTPATIENT)
Dept: BEHAVIORAL HEALTH | Facility: CLINIC | Age: 24
End: 2024-05-24
Payer: MEDICAID

## 2024-05-24 VITALS
BODY MASS INDEX: 35.47 KG/M2 | SYSTOLIC BLOOD PRESSURE: 94 MMHG | WEIGHT: 226 LBS | DIASTOLIC BLOOD PRESSURE: 85 MMHG | HEIGHT: 67 IN

## 2024-05-24 DIAGNOSIS — F51.05 INSOMNIA DUE TO OTHER MENTAL DISORDER: Primary | ICD-10-CM

## 2024-05-24 DIAGNOSIS — F51.5 NIGHTMARES: ICD-10-CM

## 2024-05-24 DIAGNOSIS — F25.1 SCHIZOAFFECTIVE DISORDER, DEPRESSIVE TYPE: ICD-10-CM

## 2024-05-24 DIAGNOSIS — F90.9 ATTENTION DEFICIT HYPERACTIVITY DISORDER (ADHD), UNSPECIFIED ADHD TYPE: ICD-10-CM

## 2024-05-24 DIAGNOSIS — F99 INSOMNIA DUE TO OTHER MENTAL DISORDER: Primary | ICD-10-CM

## 2024-05-24 RX ORDER — CLONIDINE HYDROCHLORIDE 0.1 MG/1
0.2 TABLET, EXTENDED RELEASE ORAL NIGHTLY
Qty: 60 TABLET | Refills: 1 | Status: SHIPPED | OUTPATIENT
Start: 2024-05-24

## 2024-05-24 RX ORDER — ARIPIPRAZOLE 10 MG/1
10 TABLET ORAL DAILY
Qty: 30 TABLET | Refills: 2 | Status: SHIPPED | OUTPATIENT
Start: 2024-05-24

## 2024-05-24 RX ORDER — RAMELTEON 8 MG/1
8 TABLET ORAL NIGHTLY
Qty: 30 TABLET | Refills: 2 | Status: SHIPPED | OUTPATIENT
Start: 2024-05-24

## 2024-05-24 NOTE — PROGRESS NOTES
"     Office  Follow Up Visit      Patient Name: Lane Patton  : 2000   MRN: [id number]     Referring Provider: Caity Simmons DO    Chief Complaint: Medication follow-up    ICD-10-CM ICD-9-CM   1. Insomnia due to other mental disorder  F51.05 300.9    F99 327.02   2. Attention deficit hyperactivity disorder (ADHD), unspecified ADHD type  F90.9 314.01   3. Schizoaffective disorder, depressive type  F25.1 295.70   4. Nightmares  F51.5 307.47        History of Present Illness:   Lane Patton is a 23 y.o. male who is here today for follow up related to patient management of schizoaffective disorder, ADHD, and insomnia.  Current medication regimen includes clonidine ER 0.1 mg, prazosin 2 mg nightly, Abilify 10 mg daily, trazodone 100 mg nightly.  Patient was accompanied by his mother.    She reports that things been going well \"for the most part.\"  She affirms good mood stability, that he has been more engaged, and has seen some improvement in his focus.  However, she reports that he still struggles significantly insomnia and will lie awake all night and then fall asleep in the morning time.  Overall, patient feels things are going well for her son except that continued difficulties with sleep.    Some continuation with delusional thinking.  However, patient affirms an overall decrease in his hallucinations.  Remission of nightmares.  No adverse effects at this time.      Subjective      Review of Systems:   Review of Systems   Constitutional: Negative.    Respiratory: Negative.     Cardiovascular: Negative.    Gastrointestinal: Negative.    Genitourinary: Negative.    Musculoskeletal: Negative.    Neurological: Negative.    Psychiatric/Behavioral:  Positive for sleep disturbance.      Patient History:   The following portions of the patient's history were reviewed and updated as appropriate: allergies, current medications, past family history, past medical history, past social history, " past surgical history and problem list.     Social:  No change in interval history.    Medications:     Current Outpatient Medications:     ARIPiprazole (ABILIFY) 10 MG tablet, Take 1 tablet by mouth Daily., Disp: 30 tablet, Rfl: 2    cloNIDine HCl ER 0.1 MG tablet sustained-release 12 hour tablet, Take 2 tablets by mouth Every Night., Disp: 60 tablet, Rfl: 1    azithromycin (Zithromax Z-Shaun) 250 MG tablet, Take 2 tablets by mouth on day 1, then 1 tablet daily on days 2-5, Disp: 6 tablet, Rfl: 0    benzonatate (TESSALON) 100 MG capsule, Take 1 capsule by mouth 3 (Three) Times a Day As Needed for Cough., Disp: 15 capsule, Rfl: 0    cetirizine (zyrTEC) 10 MG tablet, TAKE ONE TABLET BY MOUTH DAILY, Disp: 90 tablet, Rfl: 2    famotidine (PEPCID) 20 MG tablet, Take 1 tablet by mouth At Night As Needed for Heartburn (for heartburn)., Disp: 90 tablet, Rfl: 0    hydrocortisone 0.5 % cream, Apply  topically to the appropriate area as directed 2 (Two) Times a Day., Disp: 28.35 g, Rfl: 0    levothyroxine (SYNTHROID, LEVOTHROID) 88 MCG tablet, TAKE ONE TABLET BY MOUTH EVERY MORNING ON AN EMPTY STOMACH 1 HOUR BEFORE FOOD OR OTHER MEDICATIONS, Disp: 90 tablet, Rfl: 0    linaclotide (Linzess) 145 MCG capsule capsule, Take 1 capsule by mouth Every Morning Before Breakfast., Disp: 90 capsule, Rfl: 1    montelukast (SINGULAIR) 10 MG tablet, TAKE ONE TABLET BY MOUTH ONCE NIGHTLY, Disp: 90 tablet, Rfl: 3    ondansetron ODT (ZOFRAN-ODT) 4 MG disintegrating tablet, DISSOLVE ONE TABLET BY MOUTH EVERY 8 HOURS AS NEEDED FOR NAUSEA AND VOMITING, Disp: 12 tablet, Rfl: 0    OXcarbazepine (TRILEPTAL) 150 MG tablet, , Disp: , Rfl:     pantoprazole (PROTONIX) 40 MG EC tablet, Take 1 tablet by mouth Daily., Disp: 30 tablet, Rfl: 11    pravastatin (PRAVACHOL) 20 MG tablet, TAKE ONE TABLET BY MOUTH DAILY, Disp: 90 tablet, Rfl: 0    prazosin (MINIPRESS) 2 MG capsule, Take 1 capsule by mouth Every Night., Disp: 90 capsule, Rfl: 1    propranolol  "(INDERAL) 20 MG tablet, Take 1 tablet by mouth 2 (Two) Times a Day., Disp: 60 tablet, Rfl: 3    ramelteon (Rozerem) 8 MG tablet, Take 1 tablet by mouth Every Night., Disp: 30 tablet, Rfl: 2    SUMAtriptan (IMITREX) 100 MG tablet, Take 1 tab po prn migraine headache or stomach ache. Can repeat at 2hr prn. Max 2 in 24 hr., Disp: 9 tablet, Rfl: 0    topiramate (TOPAMAX) 50 MG tablet, TAKE 1 TABLET BY MOUTH TWICE A DAY, Disp: 180 tablet, Rfl: 1    traZODone (DESYREL) 100 MG tablet, TAKE ONE TO TWO TABLETS BY MOUTH ONCE NIGHTLY, Disp: 60 tablet, Rfl: 2    vitamin D (ERGOCALCIFEROL) 1.25 MG (58030 UT) capsule capsule, TAKE 1 CAPSULE BY MOUTH ONCE WEEKLY, Disp: 4 capsule, Rfl: 5    Objective     Physical Exam:  Vital Signs:   Vitals:    05/24/24 1119   BP: 94/85   Weight: 103 kg (226 lb)   Height: 170.2 cm (67\")     Body mass index is 35.4 kg/m².     Mental Status Exam:   Hygiene:   fair  Cooperation:  Cooperative  Eye Contact:  Poor  Psychomotor Behavior:  Appropriate  Affect:  Appropriate  Mood: normal  Speech:  Minimal  Thought Process: Tangential  Thought Content:  Mood congruent  Suicidal:  None  Homicidal:  None  Hallucinations:  None  Delusion:  Other spiritual, magical thinking  Memory:  Unable to evaluate  Orientation:  Grossly intact  Reliability:  fair  Insight:  Fair  Judgement:  Good  Impulse Control:  Fair  Physical/Medical Issues:  No      Assessment / Plan      Visit Diagnosis/Orders Placed This Visit:  Diagnoses and all orders for this visit:    1. Insomnia due to other mental disorder (Primary)  -     ramelteon (Rozerem) 8 MG tablet; Take 1 tablet by mouth Every Night.  Dispense: 30 tablet; Refill: 2  -     cloNIDine HCl ER 0.1 MG tablet sustained-release 12 hour tablet; Take 2 tablets by mouth Every Night.  Dispense: 60 tablet; Refill: 1    2. Attention deficit hyperactivity disorder (ADHD), unspecified ADHD type  -     cloNIDine HCl ER 0.1 MG tablet sustained-release 12 hour tablet; Take 2 tablets by " mouth Every Night.  Dispense: 60 tablet; Refill: 1    3. Schizoaffective disorder, depressive type  -     ARIPiprazole (ABILIFY) 10 MG tablet; Take 1 tablet by mouth Daily.  Dispense: 30 tablet; Refill: 2    4. Nightmares         Differential:   N/A    Plan:   Continue with current psychotropics as prescribed.  Increase clonidine ER to 0.2 mg.  Initiate ramelteon 8 mg.  We will try to reset his sleep cycle.  Behavioral modifications discussed.    Continue supportive psychotherapy efforts and medications as indicated. Treatment and medication options discussed during today's visit. Patient ackowledged and verbally consented to continue with current treatment plan and was educated on the importance of compliance with treatment and follow-up appointments. Patient seems reasonably able to adhere to treatment plan.      Medication Considerations:  Discussed medication options and treatment plan of prescribed medication(s) as well as the risks, benefits, and potential side effects. Patient is agreeable to call the office with any worsening of symptoms or onset of side effects. Patient is agreeable to call 911 or go to the nearest ER should he/she begin having SI/HI.    Quality Measures:   Never smoker    I advised Lane Patton of the risks of tobacco use.     Follow Up:   Return in about 3 months (around 8/24/2024) for Recheck.      LUIS Hargrove, PMHNP-BC

## 2024-05-28 ENCOUNTER — TELEPHONE (OUTPATIENT)
Dept: BEHAVIORAL HEALTH | Facility: CLINIC | Age: 24
End: 2024-05-28
Payer: MEDICAID

## 2024-05-29 NOTE — TELEPHONE ENCOUNTER
PA denied. Reason:    This request has not been approved. Based on the information sent for review, the requested drug did not meet our guideline rules. To get the request approved, your doctor needs to show that you have met the guideline rules below. If you have questions, please call your doctor. In some cases, the requested drug or alternatives offered may have other guideline rules that need to be met. Our guideline named SEDATIVE HYPNOTICS-RAMELTEON requires the following rule(s) be met for approval: A. The member meets ONE of the followin. The member has a history of substance abuse [drug abuse] 2. The member had a trial and failure [drug did not work], allergy, contraindication [harmful for] (including potential drug-drug interactions with other medications) or intolerance [side effect] to 2 preferred agents: eszopiclone, temazepam (15mg and 30mg), zolpidem

## 2024-05-30 NOTE — TELEPHONE ENCOUNTER
Earnest Patton (on release) informed that fasting Lipid panel needs to be collected to make sure medication doesn't need to be adjusted. She stated she would inform pt's mother and have her bring him in for labs tomorrow or earliest convenience.

## 2024-06-03 DIAGNOSIS — F99 INSOMNIA DUE TO OTHER MENTAL DISORDER: ICD-10-CM

## 2024-06-03 DIAGNOSIS — F51.05 INSOMNIA DUE TO OTHER MENTAL DISORDER: ICD-10-CM

## 2024-06-03 DIAGNOSIS — F90.9 ATTENTION DEFICIT HYPERACTIVITY DISORDER (ADHD), UNSPECIFIED ADHD TYPE: ICD-10-CM

## 2024-06-03 RX ORDER — PRAVASTATIN SODIUM 20 MG
20 TABLET ORAL DAILY
Qty: 90 TABLET | Refills: 0 | OUTPATIENT
Start: 2024-06-03

## 2024-06-04 RX ORDER — CLONIDINE HYDROCHLORIDE 0.1 MG/1
0.1 TABLET, EXTENDED RELEASE ORAL NIGHTLY
Qty: 30 TABLET | Refills: 5 | Status: SHIPPED | OUTPATIENT
Start: 2024-06-04

## 2024-07-05 DIAGNOSIS — E03.9 HYPOTHYROIDISM (ACQUIRED): ICD-10-CM

## 2024-07-05 RX ORDER — LEVOTHYROXINE SODIUM 88 UG/1
TABLET ORAL
Qty: 90 TABLET | Refills: 0 | Status: SHIPPED | OUTPATIENT
Start: 2024-07-05

## 2024-07-23 DIAGNOSIS — T50.905A ACUTE MEDICATION-INDUCED AKATHISIA: ICD-10-CM

## 2024-07-23 DIAGNOSIS — G25.71 ACUTE MEDICATION-INDUCED AKATHISIA: ICD-10-CM

## 2024-07-23 RX ORDER — PROPRANOLOL HYDROCHLORIDE 20 MG/1
20 TABLET ORAL 2 TIMES DAILY
Qty: 180 TABLET | Refills: 0 | Status: SHIPPED | OUTPATIENT
Start: 2024-07-23

## 2024-07-31 RX ORDER — PRAVASTATIN SODIUM 20 MG
20 TABLET ORAL DAILY
Qty: 90 TABLET | Refills: 0 | OUTPATIENT
Start: 2024-07-31

## 2024-08-01 ENCOUNTER — LAB (OUTPATIENT)
Dept: FAMILY MEDICINE CLINIC | Facility: CLINIC | Age: 24
End: 2024-08-01
Payer: MEDICAID

## 2024-08-01 DIAGNOSIS — E78.2 MIXED HYPERLIPIDEMIA: ICD-10-CM

## 2024-08-02 ENCOUNTER — TELEPHONE (OUTPATIENT)
Dept: FAMILY MEDICINE CLINIC | Facility: CLINIC | Age: 24
End: 2024-08-02
Payer: MEDICAID

## 2024-08-02 DIAGNOSIS — F99 INSOMNIA DUE TO OTHER MENTAL DISORDER: ICD-10-CM

## 2024-08-02 DIAGNOSIS — J30.89 ALLERGIC RHINITIS DUE TO OTHER ALLERGIC TRIGGER, UNSPECIFIED SEASONALITY: ICD-10-CM

## 2024-08-02 DIAGNOSIS — F51.05 INSOMNIA DUE TO OTHER MENTAL DISORDER: ICD-10-CM

## 2024-08-02 LAB
CHOLEST SERPL-MCNC: 159 MG/DL (ref 0–200)
CHOLEST/HDLC SERPL: 6.12 {RATIO}
HDLC SERPL-MCNC: 26 MG/DL (ref 40–60)
LDLC SERPL CALC-MCNC: 76 MG/DL (ref 0–100)
TRIGL SERPL-MCNC: 352 MG/DL (ref 0–150)
VLDLC SERPL CALC-MCNC: 57 MG/DL (ref 5–40)

## 2024-08-02 RX ORDER — TRAZODONE HYDROCHLORIDE 100 MG/1
100-200 TABLET ORAL NIGHTLY
Qty: 180 TABLET | Refills: 1 | Status: SHIPPED | OUTPATIENT
Start: 2024-08-02

## 2024-08-02 RX ORDER — PRAVASTATIN SODIUM 20 MG
20 TABLET ORAL NIGHTLY
Qty: 90 TABLET | Refills: 3 | Status: SHIPPED | OUTPATIENT
Start: 2024-08-02

## 2024-08-02 RX ORDER — CETIRIZINE HYDROCHLORIDE 10 MG/1
10 TABLET ORAL DAILY
Qty: 30 TABLET | Refills: 2 | Status: SHIPPED | OUTPATIENT
Start: 2024-08-02

## 2024-08-02 NOTE — TELEPHONE ENCOUNTER
MELIDA HAS CALLED REQUESTING A CALL BACK FROM SOMEONE TO GO OVER PATIENT'S LAB RESULTS.    CALL BACK NUMBER [phone]

## 2024-08-22 DIAGNOSIS — J30.1 NON-SEASONAL ALLERGIC RHINITIS DUE TO POLLEN: ICD-10-CM

## 2024-08-23 RX ORDER — MONTELUKAST SODIUM 10 MG/1
TABLET ORAL
Qty: 90 TABLET | Refills: 3 | Status: SHIPPED | OUTPATIENT
Start: 2024-08-23

## 2024-08-25 DIAGNOSIS — E55.9 VITAMIN D DEFICIENCY: ICD-10-CM

## 2024-08-26 RX ORDER — ERGOCALCIFEROL 1.25 MG/1
50000 CAPSULE, LIQUID FILLED ORAL WEEKLY
Qty: 12 CAPSULE | Refills: 0 | Status: SHIPPED | OUTPATIENT
Start: 2024-08-26

## 2024-08-27 ENCOUNTER — OFFICE VISIT (OUTPATIENT)
Dept: BEHAVIORAL HEALTH | Facility: CLINIC | Age: 24
End: 2024-08-27
Payer: MEDICAID

## 2024-08-27 ENCOUNTER — TELEPHONE (OUTPATIENT)
Dept: BEHAVIORAL HEALTH | Facility: CLINIC | Age: 24
End: 2024-08-27

## 2024-08-27 VITALS
DIASTOLIC BLOOD PRESSURE: 71 MMHG | HEART RATE: 59 BPM | SYSTOLIC BLOOD PRESSURE: 126 MMHG | HEIGHT: 67 IN | WEIGHT: 222 LBS | BODY MASS INDEX: 34.84 KG/M2

## 2024-08-27 DIAGNOSIS — F51.5 NIGHTMARES: ICD-10-CM

## 2024-08-27 DIAGNOSIS — F51.05 INSOMNIA DUE TO OTHER MENTAL DISORDER: ICD-10-CM

## 2024-08-27 DIAGNOSIS — F25.1 SCHIZOAFFECTIVE DISORDER, DEPRESSIVE TYPE: ICD-10-CM

## 2024-08-27 DIAGNOSIS — F99 INSOMNIA DUE TO OTHER MENTAL DISORDER: ICD-10-CM

## 2024-08-27 DIAGNOSIS — F90.9 ATTENTION DEFICIT HYPERACTIVITY DISORDER (ADHD), UNSPECIFIED ADHD TYPE: ICD-10-CM

## 2024-08-27 PROCEDURE — 99214 OFFICE O/P EST MOD 30 MIN: CPT

## 2024-08-27 PROCEDURE — 1159F MED LIST DOCD IN RCRD: CPT

## 2024-08-27 PROCEDURE — 1160F RVW MEDS BY RX/DR IN RCRD: CPT

## 2024-08-27 RX ORDER — CLONIDINE HYDROCHLORIDE 0.1 MG/1
0.2 TABLET, EXTENDED RELEASE ORAL NIGHTLY
Qty: 60 TABLET | Refills: 5 | Status: SHIPPED | OUTPATIENT
Start: 2024-08-27

## 2024-08-27 RX ORDER — RAMELTEON 8 MG/1
8 TABLET ORAL NIGHTLY
Qty: 30 TABLET | Refills: 2 | Status: SHIPPED | OUTPATIENT
Start: 2024-08-27

## 2024-08-27 RX ORDER — ARIPIPRAZOLE 10 MG/1
10 TABLET ORAL DAILY
Qty: 30 TABLET | Refills: 5 | Status: SHIPPED | OUTPATIENT
Start: 2024-08-27

## 2024-08-27 RX ORDER — PRAZOSIN HYDROCHLORIDE 2 MG/1
2 CAPSULE ORAL NIGHTLY
Qty: 90 CAPSULE | Refills: 1 | Status: SHIPPED | OUTPATIENT
Start: 2024-08-27

## 2024-08-27 NOTE — PROGRESS NOTES
Office  Follow Up Visit      Patient Name: Lane Patton  : 2000   MRN: [id number]     Referring Provider: Caity Simmons DO    Chief Complaint: Medication follow-up    ICD-10-CM ICD-9-CM   1. Schizoaffective disorder, depressive type  F25.1 295.70   2. Insomnia due to other mental disorder  F51.05 300.9    F99 327.02   3. Attention deficit hyperactivity disorder (ADHD), unspecified ADHD type  F90.9 314.01   4. Nightmares  F51.5 307.47        History of Present Illness:   Lane Patton is a 24 y.o. male who is here today for follow up related to medication management of schizoaffective disorder, insomnia, ADHD, and nightmares.  Current medication regimen includes Abilify 10 mg daily, trazodone 100 mg nightly, clonidine 0.2 mg nightly, prazosin 2 mg nightly, and Rozerem 8 mg nightly.  Patient was accompanied by his grandmother.    He reports that things been going well over his past 3 months.  He reports that his been hanging out with his cousin, has been more physically active, including swimming and walking.  Patient reports continued remission of nightmares as well as continued decrease in auditory hallucinations.  He also reports continued improved sleep and that has been trying to have a more consistent sleep schedule throughout the week.  Patient reports no experience adverse effects.      Subjective      Review of Systems:   Review of Systems   Constitutional: Negative.    Respiratory: Negative.     Cardiovascular: Negative.    Gastrointestinal: Negative.    Genitourinary: Negative.    Musculoskeletal: Negative.    Neurological: Negative.    Psychiatric/Behavioral: Negative.         Patient History:   The following portions of the patient's history were reviewed and updated as appropriate: allergies, current medications, past family history, past medical history, past social history, past surgical history and problem list.     Social:  No change in interval  history.    Medications:     Current Outpatient Medications:     ARIPiprazole (ABILIFY) 10 MG tablet, Take 1 tablet by mouth Daily., Disp: 30 tablet, Rfl: 5    cloNIDine HCl ER 0.1 MG tablet sustained-release 12 hour tablet, Take 2 tablets by mouth Every Night., Disp: 60 tablet, Rfl: 5    prazosin (MINIPRESS) 2 MG capsule, Take 1 capsule by mouth Every Night., Disp: 90 capsule, Rfl: 1    ramelteon (Rozerem) 8 MG tablet, Take 1 tablet by mouth Every Night., Disp: 30 tablet, Rfl: 2    azithromycin (Zithromax Z-Shaun) 250 MG tablet, Take 2 tablets by mouth on day 1, then 1 tablet daily on days 2-5, Disp: 6 tablet, Rfl: 0    benzonatate (TESSALON) 100 MG capsule, Take 1 capsule by mouth 3 (Three) Times a Day As Needed for Cough., Disp: 15 capsule, Rfl: 0    cetirizine (zyrTEC) 10 MG tablet, TAKE 1 TABLET BY MOUTH DAILY, Disp: 30 tablet, Rfl: 2    famotidine (PEPCID) 20 MG tablet, Take 1 tablet by mouth At Night As Needed for Heartburn (for heartburn)., Disp: 90 tablet, Rfl: 0    hydrocortisone 0.5 % cream, Apply  topically to the appropriate area as directed 2 (Two) Times a Day., Disp: 28.35 g, Rfl: 0    levothyroxine (SYNTHROID, LEVOTHROID) 88 MCG tablet, TAKE ONE TABLET BY MOUTH EVERY MORNING ON AN EMPTY STOMACH 1 HOUR BEFORE FOOD OR OTHER MEDICATIONS, Disp: 90 tablet, Rfl: 0    linaclotide (Linzess) 145 MCG capsule capsule, Take 1 capsule by mouth Every Morning Before Breakfast., Disp: 90 capsule, Rfl: 1    montelukast (SINGULAIR) 10 MG tablet, TAKE ONE TABLET BY MOUTH ONCE NIGHTLY, Disp: 90 tablet, Rfl: 3    ondansetron ODT (ZOFRAN-ODT) 4 MG disintegrating tablet, DISSOLVE ONE TABLET BY MOUTH EVERY 8 HOURS AS NEEDED FOR NAUSEA AND VOMITING, Disp: 12 tablet, Rfl: 0    OXcarbazepine (TRILEPTAL) 150 MG tablet, , Disp: , Rfl:     pantoprazole (PROTONIX) 40 MG EC tablet, Take 1 tablet by mouth Daily., Disp: 30 tablet, Rfl: 11    pravastatin (PRAVACHOL) 20 MG tablet, Take 1 tablet by mouth Every Night., Disp: 90 tablet,  "Rfl: 3    propranolol (INDERAL) 20 MG tablet, TAKE 1 TABLET BY MOUTH 2 TIMES A DAY, Disp: 180 tablet, Rfl: 0    SUMAtriptan (IMITREX) 100 MG tablet, Take 1 tab po prn migraine headache or stomach ache. Can repeat at 2hr prn. Max 2 in 24 hr., Disp: 9 tablet, Rfl: 0    topiramate (TOPAMAX) 50 MG tablet, TAKE 1 TABLET BY MOUTH TWICE A DAY, Disp: 180 tablet, Rfl: 1    traZODone (DESYREL) 100 MG tablet, TAKE ONE TO TWO TABLETS BY MOUTH ONCE NIGHTLY, Disp: 180 tablet, Rfl: 1    vitamin D (ERGOCALCIFEROL) 1.25 MG (20933 UT) capsule capsule, TAKE 1 CAPSULE BY MOUTH ONCE WEEKLY, Disp: 12 capsule, Rfl: 0    Objective     Physical Exam:  Vital Signs:   Vitals:    08/27/24 0852   BP: 126/71   Pulse: 59   Weight: 101 kg (222 lb)   Height: 170.2 cm (67\")     Body mass index is 34.77 kg/m².     Mental Status Exam:   Hygiene:   good  Cooperation:  Cooperative  Eye Contact:  Fair  Psychomotor Behavior:  Appropriate  Affect:  Restricted  Mood: normal  Speech:  Minimal  Thought Process:  Circum  Thought Content:  Mood congruent  Suicidal:  None  Homicidal:  None  Hallucinations:  Not demonstrated today  Delusion:  Unable to demonstrate  Memory:  Deficits  Orientation:  Grossly intact  Reliability:  fair  Insight:  Fair  Judgement:  Fair  Impulse Control:  Fair  Physical/Medical Issues:  Yes several medical comorbidities, see chart      Assessment / Plan      Visit Diagnosis/Orders Placed This Visit:  Diagnoses and all orders for this visit:    1. Schizoaffective disorder, depressive type  -     ARIPiprazole (ABILIFY) 10 MG tablet; Take 1 tablet by mouth Daily.  Dispense: 30 tablet; Refill: 5    2. Insomnia due to other mental disorder  -     cloNIDine HCl ER 0.1 MG tablet sustained-release 12 hour tablet; Take 2 tablets by mouth Every Night.  Dispense: 60 tablet; Refill: 5  -     ramelteon (Rozerem) 8 MG tablet; Take 1 tablet by mouth Every Night.  Dispense: 30 tablet; Refill: 2    3. Attention deficit hyperactivity disorder (ADHD), " unspecified ADHD type  -     cloNIDine HCl ER 0.1 MG tablet sustained-release 12 hour tablet; Take 2 tablets by mouth Every Night.  Dispense: 60 tablet; Refill: 5    4. Nightmares  -     prazosin (MINIPRESS) 2 MG capsule; Take 1 capsule by mouth Every Night.  Dispense: 90 capsule; Refill: 1         Differential:   N/A    Plan:   Stay the course.  Continue current plan of care.    Continue supportive psychotherapy efforts and medications as indicated. Treatment and medication options discussed during today's visit. Patient ackowledged and verbally consented to continue with current treatment plan and was educated on the importance of compliance with treatment and follow-up appointments. Patient seems reasonably able to adhere to treatment plan.      Medication Considerations:  Discussed medication options and treatment plan of prescribed medication(s) as well as the risks, benefits, and potential side effects. Patient is agreeable to call the office with any worsening of symptoms or onset of side effects. Patient is agreeable to call 911 or go to the nearest ER should he/she begin having SI/HI.    Quality Measures:   Never smoker    I advised Lane Patton of the risks of tobacco use.     Follow Up:   Return in about 6 months (around 2/27/2025) for Recheck.      LUIS Hargrove, PMHNP-BC

## 2024-08-28 NOTE — TELEPHONE ENCOUNTER
Pt's PA was denied per CMM:    Our guideline named SEDATIVE HYPNOTICS-RAMELTEON requires the following rule(s) be met for approval: A. The member meets ONE of the followin. The member has a history of substance abuse [drug abuse] 2. The member had a trial and failure [drug did not work], allergy, contraindication [harmful for] (including potential drug-drug interactions with other medications) or intolerance [side effect] to 2 preferred agents: eszopiclone, temazepam (15mg and 30mg), zolpidem.

## 2024-08-31 DIAGNOSIS — R51.9 DAILY HEADACHE: ICD-10-CM

## 2024-09-03 RX ORDER — TOPIRAMATE 50 MG/1
50 TABLET, FILM COATED ORAL 2 TIMES DAILY
Qty: 180 TABLET | Refills: 1 | Status: SHIPPED | OUTPATIENT
Start: 2024-09-03

## 2024-09-19 ENCOUNTER — TELEPHONE (OUTPATIENT)
Dept: BEHAVIORAL HEALTH | Facility: CLINIC | Age: 24
End: 2024-09-19
Payer: MEDICAID

## 2024-09-28 DIAGNOSIS — E03.9 HYPOTHYROIDISM (ACQUIRED): ICD-10-CM

## 2024-09-30 RX ORDER — LEVOTHYROXINE SODIUM 88 UG/1
TABLET ORAL
Qty: 90 TABLET | Refills: 0 | Status: SHIPPED | OUTPATIENT
Start: 2024-09-30

## 2024-10-03 RX ORDER — OXCARBAZEPINE 150 MG/1
150 TABLET, FILM COATED ORAL 2 TIMES DAILY
Qty: 60 TABLET | Refills: 5 | Status: SHIPPED | OUTPATIENT
Start: 2024-10-03

## 2024-10-18 ENCOUNTER — OFFICE VISIT (OUTPATIENT)
Dept: FAMILY MEDICINE CLINIC | Facility: CLINIC | Age: 24
End: 2024-10-18
Payer: MEDICAID

## 2024-10-18 VITALS
HEART RATE: 60 BPM | HEIGHT: 67 IN | SYSTOLIC BLOOD PRESSURE: 90 MMHG | RESPIRATION RATE: 20 BRPM | DIASTOLIC BLOOD PRESSURE: 54 MMHG | OXYGEN SATURATION: 98 % | WEIGHT: 221.4 LBS | BODY MASS INDEX: 34.75 KG/M2 | TEMPERATURE: 98.2 F

## 2024-10-18 DIAGNOSIS — R11.0 NAUSEA: ICD-10-CM

## 2024-10-18 DIAGNOSIS — G43.909 MIGRAINE SYNDROME: ICD-10-CM

## 2024-10-18 DIAGNOSIS — J02.9 SORE THROAT: ICD-10-CM

## 2024-10-18 DIAGNOSIS — J06.9 ACUTE URI: Primary | ICD-10-CM

## 2024-10-18 DIAGNOSIS — E66.811 OBESITY (BMI 30.0-34.9): ICD-10-CM

## 2024-10-18 PROCEDURE — 87428 SARSCOV & INF VIR A&B AG IA: CPT | Performed by: FAMILY MEDICINE

## 2024-10-18 PROCEDURE — 99214 OFFICE O/P EST MOD 30 MIN: CPT | Performed by: FAMILY MEDICINE

## 2024-10-18 RX ORDER — AZITHROMYCIN 250 MG/1
TABLET, FILM COATED ORAL
Qty: 6 TABLET | Refills: 0 | Status: SHIPPED | OUTPATIENT
Start: 2024-10-18

## 2024-10-18 RX ORDER — SUMATRIPTAN 100 MG/1
TABLET, FILM COATED ORAL
Qty: 10 TABLET | Refills: 2 | Status: SHIPPED | OUTPATIENT
Start: 2024-10-18

## 2024-10-18 RX ORDER — ONDANSETRON 4 MG/1
4 TABLET, ORALLY DISINTEGRATING ORAL EVERY 8 HOURS PRN
Qty: 20 TABLET | Refills: 0 | Status: SHIPPED | OUTPATIENT
Start: 2024-10-18

## 2024-10-18 NOTE — PROGRESS NOTES
"Chief Complaint  Sore Throat (Sore throat, headache, runny, nose, stomachache, started Wednesday night. ) and Dizziness (X1 wk, feeling dizzy and weak. \"Legs feel wobbly\".)    Subjective          Lane Patton presents to [facility]  Sore Throat   This is a new problem. The current episode started in the past 7 days (2 days ago). Maximum temperature: 99. Associated symptoms include congestion and headaches. Associated symptoms comments: Rhinorrhea, sneezing  . He has tried NSAIDs for the symptoms. The treatment provided mild relief.   Dizziness  This is a new problem. The current episode started in the past 7 days (1 week). Associated symptoms include congestion, headaches and a sore throat. Exacerbated by: moving too quickly. He has tried nothing for the symptoms.           The following portions of the patient's history were reviewed and updated as appropriate: allergies, current medications, past family history, past medical history, past social history, past surgical history, and problem list.    Objective      Physical Exam  Vitals reviewed.   HENT:      Right Ear: Tympanic membrane, ear canal and external ear normal.      Left Ear: Tympanic membrane, ear canal and external ear normal.      Mouth/Throat:      Pharynx: Postnasal drip present. No oropharyngeal exudate or posterior oropharyngeal erythema.   Cardiovascular:      Rate and Rhythm: Normal rate.      Heart sounds: Normal heart sounds.   Pulmonary:      Effort: Pulmonary effort is normal.      Breath sounds: Normal breath sounds. No wheezing or rhonchi.   Neurological:      Mental Status: He is alert.        Result Review :                Assessment and Plan    Diagnoses and all orders for this visit:    1. Acute URI (Primary)  -     azithromycin (Zithromax Z-Shaun) 250 MG tablet; Take 2 tablets by mouth on day 1, then 1 tablet daily on days 2-5  Dispense: 6 tablet; Refill: 0    2. Sore throat  -     POCT " SARS-CoV-2 Antigen ROBERT + Flu    3. Nausea  -     ondansetron ODT (ZOFRAN-ODT) 4 MG disintegrating tablet; Take 1 tablet by mouth Every 8 (Eight) Hours As Needed for Nausea or Vomiting.  Dispense: 20 tablet; Refill: 0    4. Obesity (BMI 30.0-34.9)    5. Migraine syndrome  -     SUMAtriptan (Imitrex) 100 MG tablet; Take 1 tab po prn migraine headache. Can repeat at 2hr prn. Max 2 in 24 hr.  Dispense: 10 tablet; Refill: 2    6. BMI 34.0-34.9,adult    Negative Covid and flu, azithromycin to treat URI.  Dizziness likely secondary to hypotension.  Medication list currently includes clonidine, propranolol, and prazosin.  Recommend that he and his mother discuss possible dose adjustments with behavioral health specialist if blood pressure continues to run low.    Follow Up   No follow-ups on file.  Patient was given instructions and counseling regarding his condition or for health maintenance advice. Please see specific information pulled into the AVS if appropriate.

## 2024-10-25 DIAGNOSIS — T50.905A ACUTE MEDICATION-INDUCED AKATHISIA: ICD-10-CM

## 2024-10-25 DIAGNOSIS — G25.71 ACUTE MEDICATION-INDUCED AKATHISIA: ICD-10-CM

## 2024-10-25 RX ORDER — PROPRANOLOL HCL 20 MG
20 TABLET ORAL 2 TIMES DAILY
Qty: 180 TABLET | Refills: 0 | Status: SHIPPED | OUTPATIENT
Start: 2024-10-25

## 2024-11-19 DIAGNOSIS — E55.9 VITAMIN D DEFICIENCY: ICD-10-CM

## 2024-11-19 RX ORDER — ERGOCALCIFEROL 1.25 MG/1
50000 CAPSULE, LIQUID FILLED ORAL WEEKLY
Qty: 12 CAPSULE | Refills: 0 | Status: SHIPPED | OUTPATIENT
Start: 2024-11-19

## 2024-12-04 DIAGNOSIS — J30.89 ALLERGIC RHINITIS DUE TO OTHER ALLERGIC TRIGGER, UNSPECIFIED SEASONALITY: ICD-10-CM

## 2024-12-04 RX ORDER — CETIRIZINE HYDROCHLORIDE 10 MG/1
10 TABLET ORAL DAILY
Qty: 30 TABLET | Refills: 2 | Status: SHIPPED | OUTPATIENT
Start: 2024-12-04

## 2025-01-24 DIAGNOSIS — G25.71 ACUTE MEDICATION-INDUCED AKATHISIA: ICD-10-CM

## 2025-01-24 DIAGNOSIS — T50.905A ACUTE MEDICATION-INDUCED AKATHISIA: ICD-10-CM

## 2025-01-24 RX ORDER — PROPRANOLOL HCL 20 MG
20 TABLET ORAL 2 TIMES DAILY
Qty: 180 TABLET | Refills: 0 | Status: SHIPPED | OUTPATIENT
Start: 2025-01-24

## 2025-01-27 DIAGNOSIS — F51.05 INSOMNIA DUE TO OTHER MENTAL DISORDER: ICD-10-CM

## 2025-01-27 DIAGNOSIS — E03.9 HYPOTHYROIDISM (ACQUIRED): ICD-10-CM

## 2025-01-27 DIAGNOSIS — F99 INSOMNIA DUE TO OTHER MENTAL DISORDER: ICD-10-CM

## 2025-01-28 RX ORDER — TRAZODONE HYDROCHLORIDE 100 MG/1
100-200 TABLET ORAL NIGHTLY
Qty: 180 TABLET | Refills: 1 | Status: SHIPPED | OUTPATIENT
Start: 2025-01-28

## 2025-01-28 RX ORDER — LEVOTHYROXINE SODIUM 88 UG/1
TABLET ORAL
Qty: 90 TABLET | Refills: 0 | Status: SHIPPED | OUTPATIENT
Start: 2025-01-28

## 2025-01-29 ENCOUNTER — OFFICE VISIT (OUTPATIENT)
Dept: BEHAVIORAL HEALTH | Facility: CLINIC | Age: 25
End: 2025-01-29
Payer: MEDICAID

## 2025-01-29 VITALS — BODY MASS INDEX: 33.59 KG/M2 | HEIGHT: 67 IN | WEIGHT: 214 LBS

## 2025-01-29 DIAGNOSIS — F51.05 INSOMNIA DUE TO OTHER MENTAL DISORDER: ICD-10-CM

## 2025-01-29 DIAGNOSIS — F51.5 NIGHTMARES: ICD-10-CM

## 2025-01-29 DIAGNOSIS — F90.9 ATTENTION DEFICIT HYPERACTIVITY DISORDER (ADHD), UNSPECIFIED ADHD TYPE: ICD-10-CM

## 2025-01-29 DIAGNOSIS — F99 INSOMNIA DUE TO OTHER MENTAL DISORDER: ICD-10-CM

## 2025-01-29 DIAGNOSIS — F25.1 SCHIZOAFFECTIVE DISORDER, DEPRESSIVE TYPE: Primary | ICD-10-CM

## 2025-01-29 RX ORDER — ARIPIPRAZOLE 10 MG/1
10 TABLET ORAL DAILY
Qty: 30 TABLET | Refills: 5 | Status: SHIPPED | OUTPATIENT
Start: 2025-01-29

## 2025-01-29 RX ORDER — PRAZOSIN HYDROCHLORIDE 2 MG/1
2 CAPSULE ORAL NIGHTLY
Qty: 90 CAPSULE | Refills: 1 | Status: SHIPPED | OUTPATIENT
Start: 2025-01-29

## 2025-01-29 RX ORDER — CLONIDINE HYDROCHLORIDE 0.1 MG/1
0.2 TABLET, EXTENDED RELEASE ORAL NIGHTLY
Qty: 60 TABLET | Refills: 5 | Status: SHIPPED | OUTPATIENT
Start: 2025-01-29

## 2025-01-29 RX ORDER — RAMELTEON 8 MG/1
8 TABLET ORAL NIGHTLY
Qty: 30 TABLET | Refills: 5 | Status: SHIPPED | OUTPATIENT
Start: 2025-01-29

## 2025-01-29 NOTE — PROGRESS NOTES
"     Office  Follow Up Visit      Patient Name: Lane Patton  : 2000   MRN: [id number]     Referring Provider: Caity Simmons DO    Chief Complaint: Medication follow-up    ICD-10-CM ICD-9-CM   1. Schizoaffective disorder, depressive type  F25.1 295.70   2. Nightmares  F51.5 307.47   3. Attention deficit hyperactivity disorder (ADHD), unspecified ADHD type  F90.9 314.01   4. Insomnia due to other mental disorder  F51.05 300.9    F99 327.02        History of Present Illness:   Lane Patton is a 24 y.o. male who is here today for follow up related to medication management of schizoaffective disorder, ADHD, nightmares and insomnia.  Medication regimen includes trazodone 100 to 200 mg nightly, prazosin 2 mg nightly, Rozerem 8 mg nightly, Abilify 10 mg daily, clonidine ER 0.2 mg nightly.    Patient reports things been going pretty well since his last follow-up.  He reports that his nightmares only occur around once a week.  Patient also reports that his hallucinations have decreased.  However, he reports that he still will see things periodically associated with demonic forces and spirituality.  Patient reports that his depression has all but \"resolved.\"  He reports at times will feel sad but overall feels that he is in his good mood.  Patient reports that he sleeps well most nights but will struggle occasionally with insomnia.  Patient is unsure if he has continued all the medication at night.  Spoke with patient's mother on the phone for collateral.  She reports that she thinks she has been continuing all his medication.  However, Rozerem has not had a refill for over 3 months.  Patient's mother also reports that she was informed by her mother, patient's grandmother that she has seen a couple days in which he seems very \"groggy and sedated throughout the day.\"  However, she is unsure if that is correlated with his nights when he is awake.  Overall, she feels his mood has been well managed " he does not see any issues with impulsivity.    Subjective      Review of Systems:   Review of Systems   Constitutional: Negative.    Respiratory: Negative.     Cardiovascular: Negative.    Gastrointestinal: Negative.    Genitourinary: Negative.    Musculoskeletal: Negative.    Neurological: Negative.    Psychiatric/Behavioral:  Positive for sleep disturbance.        Patient History:   The following portions of the patient's history were reviewed and updated as appropriate: allergies, current medications, past family history, past medical history, past social history, past surgical history and problem list.     Social:  No change in interval history.    Medications:     Current Outpatient Medications:     ARIPiprazole (ABILIFY) 10 MG tablet, Take 1 tablet by mouth Daily., Disp: 30 tablet, Rfl: 5    cloNIDine HCl ER 0.1 MG tablet sustained-release 12 hour tablet, Take 2 tablets by mouth Every Night., Disp: 60 tablet, Rfl: 5    prazosin (MINIPRESS) 2 MG capsule, Take 1 capsule by mouth Every Night., Disp: 90 capsule, Rfl: 1    ramelteon (Rozerem) 8 MG tablet, Take 1 tablet by mouth Every Night., Disp: 30 tablet, Rfl: 5    azithromycin (Zithromax Z-Shaun) 250 MG tablet, Take 2 tablets by mouth on day 1, then 1 tablet daily on days 2-5, Disp: 6 tablet, Rfl: 0    cetirizine (zyrTEC) 10 MG tablet, TAKE 1 TABLET BY MOUTH DAILY, Disp: 30 tablet, Rfl: 2    famotidine (PEPCID) 20 MG tablet, Take 1 tablet by mouth At Night As Needed for Heartburn (for heartburn)., Disp: 90 tablet, Rfl: 0    hydrocortisone 0.5 % cream, Apply  topically to the appropriate area as directed 2 (Two) Times a Day., Disp: 28.35 g, Rfl: 0    levothyroxine (SYNTHROID, LEVOTHROID) 88 MCG tablet, TAKE ONE TABLET BY MOUTH EVERY MORNING ON AN EMPTY STOMACH 1 HOUR BEFORE FOOD OR OTHER MEDICATIONS, Disp: 90 tablet, Rfl: 0    linaclotide (Linzess) 145 MCG capsule capsule, Take 1 capsule by mouth Every Morning Before Breakfast., Disp: 90 capsule, Rfl: 1     "montelukast (SINGULAIR) 10 MG tablet, TAKE ONE TABLET BY MOUTH ONCE NIGHTLY, Disp: 90 tablet, Rfl: 3    ondansetron ODT (ZOFRAN-ODT) 4 MG disintegrating tablet, Take 1 tablet by mouth Every 8 (Eight) Hours As Needed for Nausea or Vomiting., Disp: 20 tablet, Rfl: 0    OXcarbazepine (TRILEPTAL) 150 MG tablet, Take 1 tablet by mouth 2 (Two) Times a Day., Disp: 60 tablet, Rfl: 5    pantoprazole (PROTONIX) 40 MG EC tablet, Take 1 tablet by mouth Daily., Disp: 30 tablet, Rfl: 11    pravastatin (PRAVACHOL) 20 MG tablet, Take 1 tablet by mouth Every Night., Disp: 90 tablet, Rfl: 3    propranolol (INDERAL) 20 MG tablet, TAKE 1 TABLET BY MOUTH 2 TIMES A DAY, Disp: 180 tablet, Rfl: 0    SUMAtriptan (Imitrex) 100 MG tablet, Take 1 tab po prn migraine headache. Can repeat at 2hr prn. Max 2 in 24 hr., Disp: 10 tablet, Rfl: 2    topiramate (TOPAMAX) 50 MG tablet, TAKE 1 TABLET BY MOUTH TWICE A DAY, Disp: 180 tablet, Rfl: 1    traZODone (DESYREL) 100 MG tablet, TAKE ONE TO TWO TABLETS BY MOUTH ONCE NIGHTLY, Disp: 180 tablet, Rfl: 1    vitamin D (ERGOCALCIFEROL) 1.25 MG (82476 UT) capsule capsule, TAKE 1 CAPSULE BY MOUTH ONCE WEEKLY, Disp: 12 capsule, Rfl: 0    Objective     Physical Exam:  Vital Signs:   Vitals:    01/29/25 1120   Weight: 97.1 kg (214 lb)   Height: 170.2 cm (67\")     Body mass index is 33.52 kg/m².     Mental Status Exam:   Hygiene:   fair  Cooperation:  Cooperative  Eye Contact:  Fair  Psychomotor Behavior:  Slow  Affect:  Blunted  Mood: normal  Speech:  Normal  Thought Process:  Disorganized and Tangential  Thought Content:  Mood congruent  Suicidal:  None  Homicidal:  None  Hallucinations: None demonstrated at this time  Delusion: None demonstrated at this time  Memory:  Unable to evaluate  Orientation:  Grossly intact  Reliability:  fair  Insight:  Fair  Judgement:  Fair  Impulse Control:  Good  Physical/Medical Issues:  Yes several medical comorbidities, see chart      Assessment / Plan      Visit " Diagnosis/Orders Placed This Visit:  Diagnoses and all orders for this visit:    1. Schizoaffective disorder, depressive type (Primary)  -     ARIPiprazole (ABILIFY) 10 MG tablet; Take 1 tablet by mouth Daily.  Dispense: 30 tablet; Refill: 5    2. Nightmares  -     prazosin (MINIPRESS) 2 MG capsule; Take 1 capsule by mouth Every Night.  Dispense: 90 capsule; Refill: 1    3. Attention deficit hyperactivity disorder (ADHD), unspecified ADHD type  -     cloNIDine HCl ER 0.1 MG tablet sustained-release 12 hour tablet; Take 2 tablets by mouth Every Night.  Dispense: 60 tablet; Refill: 5    4. Insomnia due to other mental disorder  -     ramelteon (Rozerem) 8 MG tablet; Take 1 tablet by mouth Every Night.  Dispense: 30 tablet; Refill: 5  -     cloNIDine HCl ER 0.1 MG tablet sustained-release 12 hour tablet; Take 2 tablets by mouth Every Night.  Dispense: 60 tablet; Refill: 5         Differential:   N/A    Plan:   Continue with current medication regimen.  Instructed patient's mother to reach out to her mom to see if there is any correlation with his daytime sedation following periods of insomnia.    Continue supportive psychotherapy efforts and medications as indicated. Treatment and medication options discussed during today's visit. Patient ackowledged and verbally consented to continue with current treatment plan and was educated on the importance of compliance with treatment and follow-up appointments. Patient seems reasonably able to adhere to treatment plan.      Medication Considerations:  Discussed medication options and treatment plan of prescribed medication(s) as well as the risks, benefits, and potential side effects. Patient is agreeable to call the office with any worsening of symptoms or onset of side effects. Patient is agreeable to call 911 or go to the nearest ER should he/she begin having SI/HI.    Quality Measures:   Never smoker    I advised Lane Patton of the risks of tobacco use.     Follow  Up:   Return in about 6 months (around 7/29/2025) for Recheck.      LUIS Hargrove, PMHNP-BC      *Part of this note may be an electronic transcription/translation of spoken language to printed text using the Dragon Dictation System.

## 2025-01-30 ENCOUNTER — TELEPHONE (OUTPATIENT)
Dept: BEHAVIORAL HEALTH | Facility: CLINIC | Age: 25
End: 2025-01-30
Payer: MEDICAID

## 2025-02-09 DIAGNOSIS — E55.9 VITAMIN D DEFICIENCY: ICD-10-CM

## 2025-02-10 RX ORDER — ERGOCALCIFEROL 1.25 MG/1
50000 CAPSULE, LIQUID FILLED ORAL WEEKLY
Qty: 12 CAPSULE | Refills: 0 | Status: SHIPPED | OUTPATIENT
Start: 2025-02-10

## 2025-03-02 DIAGNOSIS — R51.9 DAILY HEADACHE: ICD-10-CM

## 2025-03-03 RX ORDER — TOPIRAMATE 50 MG/1
50 TABLET, FILM COATED ORAL 2 TIMES DAILY
Qty: 180 TABLET | Refills: 1 | Status: SHIPPED | OUTPATIENT
Start: 2025-03-03

## 2025-04-08 DIAGNOSIS — R05.1 ACUTE COUGH: ICD-10-CM

## 2025-04-09 RX ORDER — BENZONATATE 100 MG/1
CAPSULE ORAL
Qty: 15 CAPSULE | Refills: 0 | OUTPATIENT
Start: 2025-04-09

## 2025-04-12 DIAGNOSIS — J30.89 ALLERGIC RHINITIS DUE TO OTHER ALLERGIC TRIGGER, UNSPECIFIED SEASONALITY: ICD-10-CM

## 2025-04-14 RX ORDER — CETIRIZINE HYDROCHLORIDE 10 MG/1
10 TABLET ORAL DAILY
Qty: 30 TABLET | Refills: 2 | Status: SHIPPED | OUTPATIENT
Start: 2025-04-14

## 2025-04-16 ENCOUNTER — OFFICE VISIT (OUTPATIENT)
Dept: FAMILY MEDICINE CLINIC | Facility: CLINIC | Age: 25
End: 2025-04-16
Payer: MEDICAID

## 2025-04-16 VITALS
SYSTOLIC BLOOD PRESSURE: 120 MMHG | WEIGHT: 219 LBS | HEIGHT: 67 IN | DIASTOLIC BLOOD PRESSURE: 62 MMHG | BODY MASS INDEX: 34.37 KG/M2 | TEMPERATURE: 97.8 F | OXYGEN SATURATION: 96 % | HEART RATE: 59 BPM

## 2025-04-16 DIAGNOSIS — Z13.220 ENCOUNTER FOR LIPID SCREENING FOR CARDIOVASCULAR DISEASE: ICD-10-CM

## 2025-04-16 DIAGNOSIS — J01.00 ACUTE NON-RECURRENT MAXILLARY SINUSITIS: ICD-10-CM

## 2025-04-16 DIAGNOSIS — Z13.6 ENCOUNTER FOR LIPID SCREENING FOR CARDIOVASCULAR DISEASE: ICD-10-CM

## 2025-04-16 DIAGNOSIS — E55.9 VITAMIN D DEFICIENCY: ICD-10-CM

## 2025-04-16 DIAGNOSIS — R41.3 MEMORY CHANGES: ICD-10-CM

## 2025-04-16 DIAGNOSIS — R05.9 COUGH, UNSPECIFIED TYPE: Primary | ICD-10-CM

## 2025-04-16 DIAGNOSIS — Z13.1 SCREENING FOR DIABETES MELLITUS: ICD-10-CM

## 2025-04-16 PROCEDURE — 99214 OFFICE O/P EST MOD 30 MIN: CPT | Performed by: PHYSICIAN ASSISTANT

## 2025-04-16 PROCEDURE — 87428 SARSCOV & INF VIR A&B AG IA: CPT | Performed by: PHYSICIAN ASSISTANT

## 2025-04-16 RX ORDER — PREDNISONE 10 MG/1
TABLET ORAL
Qty: 21 EACH | Refills: 0 | Status: SHIPPED | OUTPATIENT
Start: 2025-04-16

## 2025-04-16 RX ORDER — AZITHROMYCIN 250 MG/1
TABLET, FILM COATED ORAL
Qty: 6 TABLET | Refills: 0 | Status: SHIPPED | OUTPATIENT
Start: 2025-04-16

## 2025-04-16 RX ORDER — BROMPHENIRAMINE MALEATE, PSEUDOEPHEDRINE HYDROCHLORIDE, AND DEXTROMETHORPHAN HYDROBROMIDE 2; 30; 10 MG/5ML; MG/5ML; MG/5ML
5 SYRUP ORAL 4 TIMES DAILY PRN
Qty: 200 ML | Refills: 0 | Status: SHIPPED | OUTPATIENT
Start: 2025-04-16

## 2025-04-17 LAB
25(OH)D3+25(OH)D2 SERPL-MCNC: 36.3 NG/ML (ref 30–100)
ALBUMIN SERPL-MCNC: 4.7 G/DL (ref 4.3–5.2)
ALP SERPL-CCNC: 55 IU/L (ref 44–121)
ALT SERPL-CCNC: 21 IU/L (ref 0–44)
AST SERPL-CCNC: 16 IU/L (ref 0–40)
BASOPHILS # BLD AUTO: 0 X10E3/UL (ref 0–0.2)
BASOPHILS NFR BLD AUTO: 0 %
BILIRUB SERPL-MCNC: 0.4 MG/DL (ref 0–1.2)
BUN SERPL-MCNC: 9 MG/DL (ref 6–20)
BUN/CREAT SERPL: 8 (ref 9–20)
CALCIUM SERPL-MCNC: 9.8 MG/DL (ref 8.7–10.2)
CHLORIDE SERPL-SCNC: 101 MMOL/L (ref 96–106)
CHOLEST SERPL-MCNC: 166 MG/DL (ref 100–199)
CO2 SERPL-SCNC: 22 MMOL/L (ref 20–29)
CREAT SERPL-MCNC: 1.14 MG/DL (ref 0.76–1.27)
EGFRCR SERPLBLD CKD-EPI 2021: 92 ML/MIN/1.73
EOSINOPHIL # BLD AUTO: 0.1 X10E3/UL (ref 0–0.4)
EOSINOPHIL NFR BLD AUTO: 1 %
ERYTHROCYTE [DISTWIDTH] IN BLOOD BY AUTOMATED COUNT: 12.5 % (ref 11.6–15.4)
FOLATE SERPL-MCNC: 5.5 NG/ML
GLOBULIN SER CALC-MCNC: 2.7 G/DL (ref 1.5–4.5)
GLUCOSE SERPL-MCNC: 87 MG/DL (ref 70–99)
HBA1C MFR BLD: 5.3 % (ref 4.8–5.6)
HCT VFR BLD AUTO: 49.7 % (ref 37.5–51)
HDLC SERPL-MCNC: 30 MG/DL
HGB BLD-MCNC: 16.1 G/DL (ref 13–17.7)
IMM GRANULOCYTES # BLD AUTO: 0 X10E3/UL (ref 0–0.1)
IMM GRANULOCYTES NFR BLD AUTO: 0 %
IRON SATN MFR SERPL: 22 % (ref 15–55)
IRON SERPL-MCNC: 66 UG/DL (ref 38–169)
LDLC SERPL CALC-MCNC: 94 MG/DL (ref 0–99)
LYMPHOCYTES # BLD AUTO: 3 X10E3/UL (ref 0.7–3.1)
LYMPHOCYTES NFR BLD AUTO: 26 %
MAGNESIUM SERPL-MCNC: 2.1 MG/DL (ref 1.6–2.3)
MCH RBC QN AUTO: 29.7 PG (ref 26.6–33)
MCHC RBC AUTO-ENTMCNC: 32.4 G/DL (ref 31.5–35.7)
MCV RBC AUTO: 92 FL (ref 79–97)
MONOCYTES # BLD AUTO: 0.8 X10E3/UL (ref 0.1–0.9)
MONOCYTES NFR BLD AUTO: 7 %
NEUTROPHILS # BLD AUTO: 7.8 X10E3/UL (ref 1.4–7)
NEUTROPHILS NFR BLD AUTO: 66 %
PLATELET # BLD AUTO: 297 X10E3/UL (ref 150–450)
POTASSIUM SERPL-SCNC: 4.2 MMOL/L (ref 3.5–5.2)
PROT SERPL-MCNC: 7.4 G/DL (ref 6–8.5)
RBC # BLD AUTO: 5.43 X10E6/UL (ref 4.14–5.8)
SODIUM SERPL-SCNC: 138 MMOL/L (ref 134–144)
T4 FREE SERPL-MCNC: 1.32 NG/DL (ref 0.82–1.77)
TIBC SERPL-MCNC: 297 UG/DL (ref 250–450)
TRIGL SERPL-MCNC: 246 MG/DL (ref 0–149)
TSH SERPL DL<=0.005 MIU/L-ACNC: 0.75 UIU/ML (ref 0.45–4.5)
UIBC SERPL-MCNC: 231 UG/DL (ref 111–343)
VIT B12 SERPL-MCNC: 260 PG/ML (ref 232–1245)
VLDLC SERPL CALC-MCNC: 42 MG/DL (ref 5–40)
WBC # BLD AUTO: 11.8 X10E3/UL (ref 3.4–10.8)

## 2025-04-20 NOTE — PROGRESS NOTES
"Lance Patton is a 24 y.o. male.     History of Present Illness   History of Present Illness  The patient presents for evaluation of allergies, memory issues, and dietary habits.    Symptoms consistent with allergies and sinus issues are reported, including itching eyes, frequent sneezing, and mild pain. A sensation of nausea is described, attributed to postnasal drainage. No fever or chills are reported. There is no ear pressure or pain. Allergy medication is taken both in the morning and at night, and previous treatments with steroids and Z-Shaun have been effective.    Memory lapses have been noted, raising concerns about potential side effects of current medications. No recent changes to mood-stabilizing medications have occurred. Vitamin D supplements are taken regularly.    A preference for candy is expressed, with frequent consumption admitted. He does not consider himself a selective eater and occasionally consumes nuts.       The following portions of the patient's history were reviewed and updated as appropriate: allergies, current medications, past family history, past medical history, past social history, past surgical history, and problem list.    Review of Systems  As noted per HPI     Objective   Blood pressure 120/62, pulse 59, temperature 97.8 °F (36.6 °C), height 170.2 cm (67\"), weight 99.3 kg (219 lb), SpO2 96%. Body mass index is 34.3 kg/m².     Physical Exam  Vitals reviewed.   Constitutional:       Appearance: Normal appearance.   HENT:      Head: Normocephalic.      Right Ear: Tympanic membrane, ear canal and external ear normal.      Left Ear: Tympanic membrane, ear canal and external ear normal.      Nose: Congestion present.      Mouth/Throat:      Pharynx: No posterior oropharyngeal erythema.   Cardiovascular:      Rate and Rhythm: Normal rate and regular rhythm.   Pulmonary:      Effort: Pulmonary effort is normal.      Breath sounds: Normal breath sounds.   Skin:     " General: Skin is warm and dry.   Neurological:      Mental Status: He is alert and oriented to person, place, and time.   Psychiatric:         Mood and Affect: Mood normal.         Behavior: Behavior normal.         Results  Labs   - Blood sugar levels: Normal    Diagnostic Testing   - COVID-19 test: Negative   - Influenza test: Negative    Assessment & Plan   Assessment & Plan  1. Allergies.  - Symptoms include itching eyes, sneezing, and congestion.  - Reports significant postnasal drainage causing nausea.  - Advised to continue current allergy medication routine, including morning and night doses.    2. Sinus infection.  - Developing a sinus infection with pressure and discomfort in the sinus area.  - Physical exam indicates sinus pressure and discomfort.  - Prescribed a steroid for quick symptom relief and an antibiotic to address the infection.  - A Z-Shaun has been sent to [facility].    3. Memory issues.  - Reports increased forgetfulness, potentially related to mood medications.  - Thyroid levels have not been checked in over a year.  - Blood work ordered today to assess thyroid function and vitamin levels.    4. Dietary habits.  - Prefers candy and admits to frequent consumption.  - Not a selective eater and occasionally consumes nuts.  - Advised to incorporate healthier snacks such as nuts, vegetables, and fruits to reduce candy consumption and maintain stable blood sugar levels.     Diagnoses and all orders for this visit:    1. Cough, unspecified type (Primary)  -     POCT SARS-CoV-2 Antigen ROBERT + Flu    2. Acute non-recurrent maxillary sinusitis  -     azithromycin (Zithromax Z-Shaun) 250 MG tablet; Take 2 tablets by mouth on day 1, then 1 tablet daily on days 2-5  Dispense: 6 tablet; Refill: 0  -     predniSONE (DELTASONE) 10 MG (21) dose pack; Use as directed on package  Dispense: 21 each; Refill: 0  -     brompheniramine-pseudoephedrine-DM 30-2-10 MG/5ML syrup; Take 5 mL by mouth 4 (Four) Times a Day As  Needed for Congestion or Cough.  Dispense: 200 mL; Refill: 0    3. Screening for diabetes mellitus  -     Hemoglobin A1c    4. Encounter for lipid screening for cardiovascular disease  -     Lipid Panel    5. Memory changes  -     CBC w AUTO Differential  -     Comprehensive metabolic panel  -     TSH  -     T4, free  -     Iron Profile  -     Vitamin B12  -     Folate  -     Magnesium    6. Vitamin D deficiency  -     Vitamin D 25 hydroxy               Patient or patient representative verbalized consent for the use of Ambient Listening during the visit with  ISAAC Canchola for chart documentation. 4/19/2025  20:38 EDT

## 2025-04-21 RX ORDER — OXCARBAZEPINE 150 MG/1
150 TABLET, FILM COATED ORAL 2 TIMES DAILY
Qty: 60 TABLET | Refills: 5 | Status: SHIPPED | OUTPATIENT
Start: 2025-04-21

## 2025-04-24 DIAGNOSIS — G25.71 ACUTE MEDICATION-INDUCED AKATHISIA: ICD-10-CM

## 2025-04-24 DIAGNOSIS — T50.905A ACUTE MEDICATION-INDUCED AKATHISIA: ICD-10-CM

## 2025-04-24 RX ORDER — PROPRANOLOL HCL 20 MG
20 TABLET ORAL 2 TIMES DAILY
Qty: 180 TABLET | Refills: 1 | Status: SHIPPED | OUTPATIENT
Start: 2025-04-24

## 2025-05-04 DIAGNOSIS — E55.9 VITAMIN D DEFICIENCY: ICD-10-CM

## 2025-05-05 RX ORDER — ERGOCALCIFEROL 1.25 MG/1
50000 CAPSULE, LIQUID FILLED ORAL WEEKLY
Qty: 12 CAPSULE | Refills: 0 | Status: SHIPPED | OUTPATIENT
Start: 2025-05-05

## 2025-05-16 ENCOUNTER — OFFICE VISIT (OUTPATIENT)
Dept: FAMILY MEDICINE CLINIC | Facility: CLINIC | Age: 25
End: 2025-05-16
Payer: MEDICAID

## 2025-05-16 VITALS
HEART RATE: 76 BPM | HEIGHT: 67 IN | SYSTOLIC BLOOD PRESSURE: 118 MMHG | BODY MASS INDEX: 34.81 KG/M2 | TEMPERATURE: 97.5 F | DIASTOLIC BLOOD PRESSURE: 70 MMHG | WEIGHT: 221.8 LBS | OXYGEN SATURATION: 97 %

## 2025-05-16 DIAGNOSIS — E53.8 B12 DEFICIENCY: ICD-10-CM

## 2025-05-16 DIAGNOSIS — J02.9 SORE THROAT: ICD-10-CM

## 2025-05-16 DIAGNOSIS — Z91.09 ENVIRONMENTAL ALLERGIES: ICD-10-CM

## 2025-05-16 DIAGNOSIS — Z00.00 ANNUAL PHYSICAL EXAM: Primary | ICD-10-CM

## 2025-05-16 DIAGNOSIS — G43.909 MIGRAINE SYNDROME: ICD-10-CM

## 2025-05-16 LAB
EXPIRATION DATE: NORMAL
INTERNAL CONTROL: NORMAL
Lab: NORMAL
S PYO AG THROAT QL: NEGATIVE

## 2025-05-16 RX ORDER — SUMATRIPTAN SUCCINATE 100 MG/1
TABLET ORAL
Qty: 10 TABLET | Refills: 5 | Status: SHIPPED | OUTPATIENT
Start: 2025-05-16

## 2025-05-16 NOTE — PROGRESS NOTES
Chief Complaint  Annual Exam    Subjective          Lane Patton presents to [facility]    History of Present Illness  The patient presents for an annual exam, reporting elevated triglycerides, vitamin B12 deficiency, allergies, sleep issues, and throat pain.    Approximately a month ago, blood work revealed elevated triglyceride levels. Consequently, he was advised to start fish oil supplementation, which he has been adhering to. His total cholesterol and LDL levels were within normal ranges.    He has been on a vitamin B12 supplement regimen for the past 2 weeks. He reports a significant increase in energy levels, although he is uncertain if this is a result of the medication. He also mentions experiencing some mood fluctuations and memory issues, which may be related to the low B12 levels.    He has been experiencing intermittent headaches and migraines. He is currently out of his sumatriptan medication, which he uses as needed for breakthrough headaches.    For the past few days, he has been experiencing throat soreness, which is exacerbated by coughing. He reports no recent exposure to individuals with strep throat. He had a previous infection that resulted in a knot, which is still present but has improved.    He has been experiencing sleep disturbances and is scheduled to follow up with behavioral health in 07/2025. His sleep medicine was changed recently.    He has environmental allergies and is not currently taking Zyrtec or using any nasal spray. He also reports experiencing nosebleeds.      The following portions of the patient's history were reviewed and updated as appropriate: allergies, current medications, past family history, past medical history, past social history, past surgical history, and problem list.    Objective      Physical Exam  Vitals and nursing note reviewed.   HENT:      Right Ear: Tympanic membrane, ear canal and external ear normal.       Left Ear: Tympanic membrane, ear canal and external ear normal.      Mouth/Throat:      Mouth: Mucous membranes are moist.      Pharynx: No oropharyngeal exudate.   Cardiovascular:      Rate and Rhythm: Normal rate and regular rhythm.      Heart sounds: Normal heart sounds.   Pulmonary:      Effort: Pulmonary effort is normal.      Breath sounds: Normal breath sounds.   Neurological:      Mental Status: He is alert.        Physical Exam      Result Review :       Results               Assessment and Plan    Diagnoses and all orders for this visit:    1. Annual physical exam (Primary)  -     CBC & Differential; Future  -     Vitamin B12; Future    2. Environmental allergies  -     Ambulatory Referral to Allergy  -     CBC & Differential; Future  -     Vitamin B12; Future    3. B12 deficiency  -     CBC & Differential; Future  -     Vitamin B12; Future    4. Migraine syndrome  -     SUMAtriptan (Imitrex) 100 MG tablet; Take 1 tab po prn migraine headache. Can repeat at 2hr prn. Max 2 in 24 hr.  Dispense: 10 tablet; Refill: 5    5. Sore throat  -     POCT rapid strep A      Assessment & Plan  1. Annual examination.  - His total cholesterol and LDL levels are within the normal range, but there is a slight elevation in his triglycerides.  - His diabetes screening results were normal. His thyroid function tests, including TSH and free T4, are also normal. His vitamin D levels are satisfactory, and his B12 levels, although on the lower end of the normal range, are still within the normal range. His folate levels are normal.  - A referral to an allergist will be made for further evaluation of his allergies.  - A prescription for sumatriptan will be sent to pharmacy. Repeat labs will be ordered to monitor his B12 levels and blood count panel in the coming weeks.    2. Elevated triglycerides.  - His triglycerides were slightly elevated on recent blood work.  - Fish oil supplements have been recommended to manage his elevated  triglycerides.  - He has started taking fish oil supplements.  - Continue fish oil supplements and monitor triglyceride levels.    3. Vitamin B12 deficiency.  - His B12 levels are on the lower end of the normal range.  - He has been taking vitamin B12 supplements for about 2 weeks.  - He reports improved energy levels since starting B12 supplements.  - Continue B12 supplements and recheck B12 levels in the coming weeks.    4. Allergies.  - He reports environmental allergies and is currently taking cetirizine.  - He is advised to consider using a nasal spray to help with symptoms.  - A referral to an allergist will be made for further evaluation and testing.  - Monitor allergy symptoms and consider nasal spray for additional relief.    5. Sleep issues.  - He reports that his sleep has been inconsistent even with the addition of a sleep medication.  - Follow up with behavioral health in 07/2025 to reassess his sleep medication and overall sleep hygiene.  - Monitor sleep patterns and effectiveness of current sleep medication.  - Consider adjustments to sleep medication if necessary.    6. Throat pain.  - He reports throat pain that has been present for a couple of days.  - A throat swab will be performed today to rule out strep throat, negative results.          Follow Up   No follow-ups on file.    Patient or patient representative verbalized consent for the use of Ambient Listening during the visit with  Caity Simmons DO for chart documentation. 5/16/2025  14:58 EDT  Patient was given instructions and counseling regarding his condition or for health maintenance advice. Please see specific information pulled into the AVS if appropriate.

## 2025-05-20 ENCOUNTER — TELEPHONE (OUTPATIENT)
Dept: BEHAVIORAL HEALTH | Facility: CLINIC | Age: 25
End: 2025-05-20
Payer: MEDICAID

## 2025-06-16 DIAGNOSIS — E03.9 HYPOTHYROIDISM (ACQUIRED): ICD-10-CM

## 2025-06-16 RX ORDER — LEVOTHYROXINE SODIUM 88 UG/1
TABLET ORAL
Qty: 90 TABLET | Refills: 0 | Status: SHIPPED | OUTPATIENT
Start: 2025-06-16

## 2025-06-18 DIAGNOSIS — F25.1 SCHIZOAFFECTIVE DISORDER, DEPRESSIVE TYPE: ICD-10-CM

## 2025-06-18 RX ORDER — ARIPIPRAZOLE 10 MG/1
10 TABLET ORAL DAILY
Qty: 30 TABLET | Refills: 5 | Status: SHIPPED | OUTPATIENT
Start: 2025-06-18

## 2025-07-17 ENCOUNTER — OFFICE VISIT (OUTPATIENT)
Dept: BEHAVIORAL HEALTH | Facility: CLINIC | Age: 25
End: 2025-07-17
Payer: MEDICAID

## 2025-07-17 VITALS — WEIGHT: 221 LBS | HEIGHT: 67 IN | BODY MASS INDEX: 34.69 KG/M2

## 2025-07-17 DIAGNOSIS — F25.1 SCHIZOAFFECTIVE DISORDER, DEPRESSIVE TYPE: ICD-10-CM

## 2025-07-17 DIAGNOSIS — F90.9 ATTENTION DEFICIT HYPERACTIVITY DISORDER (ADHD), UNSPECIFIED ADHD TYPE: ICD-10-CM

## 2025-07-17 DIAGNOSIS — F51.5 NIGHTMARES: ICD-10-CM

## 2025-07-17 DIAGNOSIS — F51.05 INSOMNIA DUE TO OTHER MENTAL DISORDER: Primary | ICD-10-CM

## 2025-07-17 DIAGNOSIS — F99 INSOMNIA DUE TO OTHER MENTAL DISORDER: Primary | ICD-10-CM

## 2025-07-17 RX ORDER — CLONIDINE HYDROCHLORIDE 0.1 MG/1
0.2 TABLET, EXTENDED RELEASE ORAL NIGHTLY
Qty: 60 TABLET | Refills: 5 | Status: SHIPPED | OUTPATIENT
Start: 2025-07-17

## 2025-07-17 RX ORDER — TEMAZEPAM 15 MG/1
15 CAPSULE ORAL NIGHTLY
Qty: 30 CAPSULE | Refills: 0 | Status: SHIPPED | OUTPATIENT
Start: 2025-07-17

## 2025-07-17 RX ORDER — TRAZODONE HYDROCHLORIDE 100 MG/1
100-200 TABLET ORAL NIGHTLY
Qty: 180 TABLET | Refills: 1 | Status: SHIPPED | OUTPATIENT
Start: 2025-07-17

## 2025-07-17 RX ORDER — PRAZOSIN HYDROCHLORIDE 2 MG/1
2 CAPSULE ORAL NIGHTLY
Qty: 90 CAPSULE | Refills: 1 | Status: SHIPPED | OUTPATIENT
Start: 2025-07-17

## 2025-07-17 NOTE — PROGRESS NOTES
Office  Follow Up Visit      Patient Name: Lane Patton  : 2000   MRN: [id number]     Referring Provider: Caity Simmons DO    Chief Complaint: Occasion follow-up    ICD-10-CM ICD-9-CM   1. Insomnia due to other mental disorder  F51.05 300.9    F99 327.02   2. Attention deficit hyperactivity disorder (ADHD), unspecified ADHD type  F90.9 314.01   3. Nightmares  F51.5 307.47   4. Schizoaffective disorder, depressive type  F25.1 295.70        History of Present Illness:   Lane Patton is a 24 y.o. male who is here today for follow up related to medication management of the ADHD, schizoaffective disorder, insomnia, nightmares, and history of autism spectrum disorder.  Medication regimen includes trazodone 100 mg nightly, Rozerem 8 mg nightly, prazosin 2 mg nightly, clonidine ER 0.2 mg nightly, and Abilify 10 mg daily.  Patient was accompanied by his mother for the duration of the appointment.    Patient and patient's mother reports things been going pretty well since his last follow-up.  However, she reports continued difficulties with sleep onset and appropriate sleep cycle.  She reports that the medication that he takes will make him sleepy but he usually not fall asleep until early in the morning.  His current sleep cycle is going to sleep around 0400 and waking up in the late morning.  She reports this is problematic related to his therapy appointments which are very dated today which will be either at 0700 or 1000.  Patient reports spending excessive amounts on screen time at night including blanca and watching [employer] videos.  Patient reports continued decline in frequency of nightmares with prazosin use.  No mood issues reported at this time.      Subjective      Review of Systems:   Review of Systems   Constitutional:  Positive for fatigue.   Respiratory: Negative.     Cardiovascular: Negative.    Gastrointestinal: Negative.    Genitourinary: Negative.    Neurological:  Negative.    Psychiatric/Behavioral:  Positive for sleep disturbance.        Patient History:   The following portions of the patient's history were reviewed and updated as appropriate: allergies, current medications, past family history, past medical history, past social history, past surgical history and problem list.     Social:  No change in interval history.    Medications:     Current Outpatient Medications:     cloNIDine HCl ER 0.1 MG tablet sustained-release 12 hour tablet, Take 2 tablets by mouth Every Night., Disp: 60 tablet, Rfl: 5    prazosin (MINIPRESS) 2 MG capsule, Take 1 capsule by mouth Every Night., Disp: 90 capsule, Rfl: 1    traZODone (DESYREL) 100 MG tablet, Take 1-2 tablets by mouth Every Night., Disp: 180 tablet, Rfl: 1    ARIPiprazole (ABILIFY) 10 MG tablet, TAKE 1 TABLET BY MOUTH DAILY, Disp: 30 tablet, Rfl: 5    cetirizine (zyrTEC) 10 MG tablet, TAKE 1 TABLET BY MOUTH DAILY, Disp: 30 tablet, Rfl: 2    famotidine (PEPCID) 20 MG tablet, Take 1 tablet by mouth At Night As Needed for Heartburn (for heartburn)., Disp: 90 tablet, Rfl: 0    hydrocortisone 0.5 % cream, Apply  topically to the appropriate area as directed 2 (Two) Times a Day., Disp: 28.35 g, Rfl: 0    levothyroxine (SYNTHROID, LEVOTHROID) 88 MCG tablet, TAKE ONE TABLET BY MOUTH EVERY MORNING ON AN EMPTY STOMACH 1 HOUR BEFORE FOOD OR OTHER MEDICATIONS, Disp: 90 tablet, Rfl: 0    linaclotide (Linzess) 145 MCG capsule capsule, Take 1 capsule by mouth Every Morning Before Breakfast., Disp: 90 capsule, Rfl: 1    montelukast (SINGULAIR) 10 MG tablet, TAKE ONE TABLET BY MOUTH ONCE NIGHTLY, Disp: 90 tablet, Rfl: 3    ondansetron ODT (ZOFRAN-ODT) 4 MG disintegrating tablet, Take 1 tablet by mouth Every 8 (Eight) Hours As Needed for Nausea or Vomiting., Disp: 20 tablet, Rfl: 0    OXcarbazepine (TRILEPTAL) 150 MG tablet, TAKE 1 TABLET BY MOUTH 2 TIMES A DAY, Disp: 60 tablet, Rfl: 5    pantoprazole (PROTONIX) 40 MG EC tablet, Take 1 tablet by  "mouth Daily., Disp: 30 tablet, Rfl: 11    pravastatin (PRAVACHOL) 20 MG tablet, Take 1 tablet by mouth Every Night., Disp: 90 tablet, Rfl: 3    predniSONE (DELTASONE) 10 MG (21) dose pack, Use as directed on package, Disp: 21 each, Rfl: 0    propranolol (INDERAL) 20 MG tablet, TAKE 1 TABLET BY MOUTH 2 TIMES A DAY, Disp: 180 tablet, Rfl: 1    SUMAtriptan (Imitrex) 100 MG tablet, Take 1 tab po prn migraine headache. Can repeat at 2hr prn. Max 2 in 24 hr., Disp: 10 tablet, Rfl: 5    temazepam (RESTORIL) 15 MG capsule, Take 1 capsule by mouth Every Night., Disp: 30 capsule, Rfl: 0    topiramate (TOPAMAX) 50 MG tablet, TAKE 1 TABLET BY MOUTH TWICE A DAY, Disp: 180 tablet, Rfl: 1    vitamin D (ERGOCALCIFEROL) 1.25 MG (69418 UT) capsule capsule, TAKE 1 CAPSULE BY MOUTH ONCE WEEKLY, Disp: 12 capsule, Rfl: 0    Objective     Physical Exam:  Vital Signs:   Vitals:    07/17/25 1056   Weight: 100 kg (221 lb)   Height: 170.2 cm (67\")     Body mass index is 34.61 kg/m².     Mental Status Exam:   Hygiene:   good  Cooperation:  Cooperative  Eye Contact:  Fair  Psychomotor Behavior:  Slow  Affect:  Blunted  Mood: euthymic  Speech:  Minimal and Monotone  Thought Process:  Circum and Tangential  Thought Content:  Mood incongruent  Suicidal:  None  Homicidal:  None  Hallucinations:  None  Delusion:  None  Memory:  Intact  Orientation:  Grossly intact  Reliability:  fair  Insight:  Poor  Judgement:  Good  Impulse Control:  Fair  Physical/Medical Issues:  No      Assessment / Plan      Visit Diagnosis/Orders Placed This Visit:  Diagnoses and all orders for this visit:    1. Insomnia due to other mental disorder (Primary)  -     temazepam (RESTORIL) 15 MG capsule; Take 1 capsule by mouth Every Night.  Dispense: 30 capsule; Refill: 0  -     cloNIDine HCl ER 0.1 MG tablet sustained-release 12 hour tablet; Take 2 tablets by mouth Every Night.  Dispense: 60 tablet; Refill: 5  -     traZODone (DESYREL) 100 MG tablet; Take 1-2 tablets by mouth " Every Night.  Dispense: 180 tablet; Refill: 1    2. Attention deficit hyperactivity disorder (ADHD), unspecified ADHD type  -     cloNIDine HCl ER 0.1 MG tablet sustained-release 12 hour tablet; Take 2 tablets by mouth Every Night.  Dispense: 60 tablet; Refill: 5    3. Nightmares  -     prazosin (MINIPRESS) 2 MG capsule; Take 1 capsule by mouth Every Night.  Dispense: 90 capsule; Refill: 1    4. Schizoaffective disorder, depressive type         Differential:   N/A    Plan:   Discontinue Rozerem.   Initiate temazepam 15 mg nightly.  Discussed sleep hygiene and minimizing screen time prior to bedtime.  Discussed ways to reset sleep cycle by getting up earlier in the morning in order to fall asleep easier at night.  Continue with all other psychotropics as ordered at this time.    Continue supportive psychotherapy efforts and medications as indicated. Treatment and medication options discussed during today's visit. Patient ackowledged and verbally consented to continue with current treatment plan and was educated on the importance of compliance with treatment and follow-up appointments. Patient seems reasonably able to adhere to treatment plan.      Medication Considerations:  Discussed medication options and treatment plan of prescribed medication(s) as well as the risks, benefits, and potential side effects. Patient is agreeable to call the office with any worsening of symptoms or onset of side effects. Patient is agreeable to call 911 or go to the nearest ER should he/she begin having SI/HI.    Quality Measures:   Never smoker    I advised Lane Patton of the risks of tobacco use.     Follow Up:   Return in about 6 months (around 1/17/2026) for Recheck.      LUIS Hargrove, PMHNP-BC      *Part of this note may be an electronic transcription/translation of spoken language to printed text using the Dragon Dictation System.

## 2025-07-25 DIAGNOSIS — E55.9 VITAMIN D DEFICIENCY: ICD-10-CM

## 2025-07-25 RX ORDER — ERGOCALCIFEROL 1.25 MG/1
50000 CAPSULE, LIQUID FILLED ORAL WEEKLY
Qty: 12 CAPSULE | Refills: 0 | Status: SHIPPED | OUTPATIENT
Start: 2025-07-25

## 2025-07-28 RX ORDER — PRAVASTATIN SODIUM 20 MG
20 TABLET ORAL NIGHTLY
Qty: 90 TABLET | Refills: 3 | Status: SHIPPED | OUTPATIENT
Start: 2025-07-28

## 2025-08-07 ENCOUNTER — TELEPHONE (OUTPATIENT)
Dept: BEHAVIORAL HEALTH | Facility: CLINIC | Age: 25
End: 2025-08-07
Payer: MEDICAID